# Patient Record
Sex: MALE | Race: WHITE | NOT HISPANIC OR LATINO
[De-identification: names, ages, dates, MRNs, and addresses within clinical notes are randomized per-mention and may not be internally consistent; named-entity substitution may affect disease eponyms.]

---

## 2017-06-23 PROBLEM — Z00.00 ENCOUNTER FOR PREVENTIVE HEALTH EXAMINATION: Status: ACTIVE | Noted: 2017-06-23

## 2017-08-18 ENCOUNTER — APPOINTMENT (OUTPATIENT)
Dept: SURGERY | Facility: CLINIC | Age: 67
End: 2017-08-18

## 2018-02-02 ENCOUNTER — OTHER (OUTPATIENT)
Age: 68
End: 2018-02-02

## 2018-02-02 ENCOUNTER — APPOINTMENT (OUTPATIENT)
Dept: SURGERY | Facility: CLINIC | Age: 68
End: 2018-02-02

## 2018-02-28 ENCOUNTER — EMERGENCY (EMERGENCY)
Facility: HOSPITAL | Age: 68
LOS: 0 days | Discharge: HOME | End: 2018-02-28
Attending: EMERGENCY MEDICINE

## 2018-02-28 VITALS
RESPIRATION RATE: 19 BRPM | OXYGEN SATURATION: 97 % | DIASTOLIC BLOOD PRESSURE: 78 MMHG | HEART RATE: 80 BPM | SYSTOLIC BLOOD PRESSURE: 131 MMHG | TEMPERATURE: 98 F

## 2018-02-28 VITALS
RESPIRATION RATE: 18 BRPM | HEART RATE: 75 BPM | HEIGHT: 65 IN | OXYGEN SATURATION: 97 % | WEIGHT: 184.09 LBS | TEMPERATURE: 98 F | DIASTOLIC BLOOD PRESSURE: 75 MMHG | SYSTOLIC BLOOD PRESSURE: 152 MMHG

## 2018-02-28 DIAGNOSIS — Z98.84 BARIATRIC SURGERY STATUS: ICD-10-CM

## 2018-02-28 DIAGNOSIS — S61.212A LACERATION WITHOUT FOREIGN BODY OF RIGHT MIDDLE FINGER WITHOUT DAMAGE TO NAIL, INITIAL ENCOUNTER: ICD-10-CM

## 2018-02-28 DIAGNOSIS — S61.214A LACERATION WITHOUT FOREIGN BODY OF RIGHT RING FINGER WITHOUT DAMAGE TO NAIL, INITIAL ENCOUNTER: ICD-10-CM

## 2018-02-28 DIAGNOSIS — Z79.82 LONG TERM (CURRENT) USE OF ASPIRIN: ICD-10-CM
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
## 2018-02-28 DIAGNOSIS — E78.5 HYPERLIPIDEMIA, UNSPECIFIED: ICD-10-CM
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
## 2018-02-28 DIAGNOSIS — K21.9 GASTRO-ESOPHAGEAL REFLUX DISEASE WITHOUT ESOPHAGITIS: ICD-10-CM

## 2018-02-28 DIAGNOSIS — Z23 ENCOUNTER FOR IMMUNIZATION: ICD-10-CM
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
## 2018-02-28 DIAGNOSIS — I10 ESSENTIAL (PRIMARY) HYPERTENSION: ICD-10-CM
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
## 2018-02-28 RX ORDER — TETANUS TOXOID, REDUCED DIPHTHERIA TOXOID AND ACELLULAR PERTUSSIS VACCINE, ADSORBED 5; 2.5; 8; 8; 2.5 [IU]/.5ML; [IU]/.5ML; UG/.5ML; UG/.5ML; UG/.5ML
0.5 SUSPENSION INTRAMUSCULAR ONCE
Qty: 0 | Refills: 0 | Status: COMPLETED | OUTPATIENT
Start: 2018-02-28 | End: 2018-02-28

## 2018-02-28 RX ADMIN — TETANUS TOXOID, REDUCED DIPHTHERIA TOXOID AND ACELLULAR PERTUSSIS VACCINE, ADSORBED 0.5 MILLILITER(S): 5; 2.5; 8; 8; 2.5 SUSPENSION INTRAMUSCULAR at 14:35

## 2018-02-28 NOTE — ED PROVIDER NOTE - SECONDARY DIAGNOSIS.
Need for tetanus booster Laceration without foreign body of right ring finger without damage to nail, initial encounter

## 2018-02-28 NOTE — ED ADULT NURSE NOTE - PMH
Gastric bypass status for obesity    GERD (gastroesophageal reflux disease)    HLD (hyperlipidemia)    HTN (hypertension)

## 2018-02-28 NOTE — ED PROVIDER NOTE - MEDICAL DECISION MAKING DETAILS
Wound care and repair. Pt instructed on proper wound care.  Will monitor area closely for signs of infection and will return if any redness, streaking, drainage, increased swelling, fevers or any other concerns. Suture removal 2 wks

## 2018-02-28 NOTE — ED PROVIDER NOTE - OBJECTIVE STATEMENT
Pt comes in c/o a laceration to his right hand. Pt states that his hand got caught in a hinge on a truck. Pt denies any numbness or tingling. Pt requires a tetanus shot.

## 2018-02-28 NOTE — ED PROVIDER NOTE - ATTENDING CONTRIBUTION TO CARE
67 yo M presents with c/o lacerations to right hand sustained after he caught it in door hinge .  Needs Tetanus.  On exam pt in NAD AAO x 3, + laceration x 2 to base of right middle and right ring finger dorsal aspect, good ROM, sensation intact  wound care and repair, Tetanus

## 2018-03-07 ENCOUNTER — OTHER (OUTPATIENT)
Age: 68
End: 2018-03-07

## 2018-04-02 ENCOUNTER — OTHER (OUTPATIENT)
Age: 68
End: 2018-04-02

## 2018-04-16 ENCOUNTER — OTHER (OUTPATIENT)
Age: 68
End: 2018-04-16

## 2018-05-14 ENCOUNTER — APPOINTMENT (OUTPATIENT)
Dept: SURGERY | Facility: CLINIC | Age: 68
End: 2018-05-14
Payer: MEDICARE

## 2018-05-14 VITALS — WEIGHT: 192 LBS | BODY MASS INDEX: 31.6 KG/M2 | HEIGHT: 65.5 IN

## 2018-05-14 DIAGNOSIS — G89.29 LOW BACK PAIN: ICD-10-CM

## 2018-05-14 DIAGNOSIS — Z82.3 FAMILY HISTORY OF STROKE: ICD-10-CM

## 2018-05-14 DIAGNOSIS — Z87.09 PERSONAL HISTORY OF OTHER DISEASES OF THE RESPIRATORY SYSTEM: ICD-10-CM

## 2018-05-14 DIAGNOSIS — Z82.49 FAMILY HISTORY OF ISCHEMIC HEART DISEASE AND OTHER DISEASES OF THE CIRCULATORY SYSTEM: ICD-10-CM

## 2018-05-14 DIAGNOSIS — M54.5 LOW BACK PAIN: ICD-10-CM

## 2018-05-14 PROCEDURE — 99213 OFFICE O/P EST LOW 20 MIN: CPT

## 2018-08-13 ENCOUNTER — APPOINTMENT (OUTPATIENT)
Dept: SURGERY | Facility: CLINIC | Age: 68
End: 2018-08-13

## 2021-05-24 NOTE — ED PROVIDER NOTE - CARE PLAN
24-May-2021 23:39
Principal Discharge DX:	Laceration without foreign body of right middle finger without damage to nail, initial encounter  Secondary Diagnosis:	Need for tetanus booster  Secondary Diagnosis:	Laceration without foreign body of right ring finger without damage to nail, initial encounter

## 2021-07-26 ENCOUNTER — INPATIENT (INPATIENT)
Facility: HOSPITAL | Age: 71
LOS: 3 days | Discharge: HOME | End: 2021-07-30
Attending: SURGERY | Admitting: SURGERY
Payer: MEDICARE

## 2021-07-26 VITALS
WEIGHT: 192.02 LBS | OXYGEN SATURATION: 100 % | DIASTOLIC BLOOD PRESSURE: 72 MMHG | HEART RATE: 75 BPM | SYSTOLIC BLOOD PRESSURE: 135 MMHG | RESPIRATION RATE: 18 BRPM | TEMPERATURE: 99 F | HEIGHT: 65 IN

## 2021-07-26 LAB
ALBUMIN SERPL ELPH-MCNC: 4.5 G/DL — SIGNIFICANT CHANGE UP (ref 3.5–5.2)
ALP SERPL-CCNC: 80 U/L — SIGNIFICANT CHANGE UP (ref 30–115)
ALT FLD-CCNC: 35 U/L — SIGNIFICANT CHANGE UP (ref 0–41)
ANION GAP SERPL CALC-SCNC: 16 MMOL/L — HIGH (ref 7–14)
ANISOCYTOSIS BLD QL: SLIGHT — SIGNIFICANT CHANGE UP
AST SERPL-CCNC: 28 U/L — SIGNIFICANT CHANGE UP (ref 0–41)
BASOPHILS # BLD AUTO: 0 K/UL — SIGNIFICANT CHANGE UP (ref 0–0.2)
BASOPHILS NFR BLD AUTO: 0 % — SIGNIFICANT CHANGE UP (ref 0–1)
BILIRUB SERPL-MCNC: 0.8 MG/DL — SIGNIFICANT CHANGE UP (ref 0.2–1.2)
BUN SERPL-MCNC: 23 MG/DL — HIGH (ref 10–20)
CALCIUM SERPL-MCNC: 9.2 MG/DL — SIGNIFICANT CHANGE UP (ref 8.5–10.1)
CHLORIDE SERPL-SCNC: 94 MMOL/L — LOW (ref 98–110)
CO2 SERPL-SCNC: 24 MMOL/L — SIGNIFICANT CHANGE UP (ref 17–32)
CREAT SERPL-MCNC: 1.5 MG/DL — SIGNIFICANT CHANGE UP (ref 0.7–1.5)
EOSINOPHIL # BLD AUTO: 0 K/UL — SIGNIFICANT CHANGE UP (ref 0–0.7)
EOSINOPHIL NFR BLD AUTO: 0 % — SIGNIFICANT CHANGE UP (ref 0–8)
GIANT PLATELETS BLD QL SMEAR: PRESENT — SIGNIFICANT CHANGE UP
GLUCOSE SERPL-MCNC: 229 MG/DL — HIGH (ref 70–99)
HCT VFR BLD CALC: 37.1 % — LOW (ref 42–52)
HGB BLD-MCNC: 12.5 G/DL — LOW (ref 14–18)
LIDOCAIN IGE QN: 91 U/L — HIGH (ref 7–60)
LYMPHOCYTES # BLD AUTO: 2.83 K/UL — SIGNIFICANT CHANGE UP (ref 1.2–3.4)
LYMPHOCYTES # BLD AUTO: 24.6 % — SIGNIFICANT CHANGE UP (ref 20.5–51.1)
MAGNESIUM SERPL-MCNC: 1.7 MG/DL — LOW (ref 1.8–2.4)
MANUAL SMEAR VERIFICATION: SIGNIFICANT CHANGE UP
MCHC RBC-ENTMCNC: 30.9 PG — SIGNIFICANT CHANGE UP (ref 27–31)
MCHC RBC-ENTMCNC: 33.7 G/DL — SIGNIFICANT CHANGE UP (ref 32–37)
MCV RBC AUTO: 91.6 FL — SIGNIFICANT CHANGE UP (ref 80–94)
MICROCYTES BLD QL: SLIGHT — SIGNIFICANT CHANGE UP
MONOCYTES # BLD AUTO: 1.1 K/UL — HIGH (ref 0.1–0.6)
MONOCYTES NFR BLD AUTO: 9.6 % — HIGH (ref 1.7–9.3)
NEUTROPHILS # BLD AUTO: 6.47 K/UL — SIGNIFICANT CHANGE UP (ref 1.4–6.5)
NEUTROPHILS NFR BLD AUTO: 54.4 % — SIGNIFICANT CHANGE UP (ref 42.2–75.2)
NEUTS BAND # BLD: 1.8 % — SIGNIFICANT CHANGE UP (ref 0–6)
PLAT MORPH BLD: NORMAL — SIGNIFICANT CHANGE UP
PLATELET # BLD AUTO: 198 K/UL — SIGNIFICANT CHANGE UP (ref 130–400)
POLYCHROMASIA BLD QL SMEAR: SLIGHT — SIGNIFICANT CHANGE UP
POTASSIUM SERPL-MCNC: 4.3 MMOL/L — SIGNIFICANT CHANGE UP (ref 3.5–5)
POTASSIUM SERPL-SCNC: 4.3 MMOL/L — SIGNIFICANT CHANGE UP (ref 3.5–5)
PROT SERPL-MCNC: 7 G/DL — SIGNIFICANT CHANGE UP (ref 6–8)
RBC # BLD: 4.05 M/UL — LOW (ref 4.7–6.1)
RBC # FLD: 12.5 % — SIGNIFICANT CHANGE UP (ref 11.5–14.5)
RBC BLD AUTO: ABNORMAL
SARS-COV-2 RNA SPEC QL NAA+PROBE: SIGNIFICANT CHANGE UP
SMUDGE CELLS # BLD: PRESENT — SIGNIFICANT CHANGE UP
SODIUM SERPL-SCNC: 134 MMOL/L — LOW (ref 135–146)
TROPONIN T SERPL-MCNC: <0.01 NG/ML — SIGNIFICANT CHANGE UP
VARIANT LYMPHS # BLD: 9.6 % — HIGH (ref 0–5)
WBC # BLD: 11.51 K/UL — HIGH (ref 4.8–10.8)
WBC # FLD AUTO: 11.51 K/UL — HIGH (ref 4.8–10.8)

## 2021-07-26 PROCEDURE — 99285 EMERGENCY DEPT VISIT HI MDM: CPT

## 2021-07-26 PROCEDURE — 93010 ELECTROCARDIOGRAM REPORT: CPT | Mod: 76

## 2021-07-26 PROCEDURE — 71046 X-RAY EXAM CHEST 2 VIEWS: CPT | Mod: 26

## 2021-07-26 RX ORDER — MAGNESIUM SULFATE 500 MG/ML
2 VIAL (ML) INJECTION ONCE
Refills: 0 | Status: COMPLETED | OUTPATIENT
Start: 2021-07-26 | End: 2021-07-26

## 2021-07-26 RX ORDER — KETOROLAC TROMETHAMINE 30 MG/ML
30 SYRINGE (ML) INJECTION ONCE
Refills: 0 | Status: DISCONTINUED | OUTPATIENT
Start: 2021-07-26 | End: 2021-07-26

## 2021-07-26 RX ADMIN — Medication 30 MILLIGRAM(S): at 21:12

## 2021-07-26 RX ADMIN — Medication 50 GRAM(S): at 21:53

## 2021-07-26 NOTE — ED PROVIDER NOTE - NS_EDPROVIDERDISPOUSERTYPE_ED_A_ED
What Type Of Note Output Would You Prefer (Optional)?: Standard Output How Severe Is Your Rash?: mild Is This A New Presentation, Or A Follow-Up?: Rash Attending Attestation (For Attendings USE Only)...

## 2021-07-26 NOTE — ED PROVIDER NOTE - ATTENDING CONTRIBUTION TO CARE
71M pmh dm, htn, route en y by Dr. Heredia 2014 p/w chest/epigastric pain since 10pm. Described R sided cp radiating to epigastrium, started @ rest, accomp by diaphoresis. Called 911, given ntg SL & full asa en route to ED. No dizziness, nv, flank pain, lower abd pain, LUTSx, edema, focal numbness or weakness. Rpts nl stress test with Dr. Banerjee 3/2021. +Smoker, +FHx HD. Admits to daily etoh use. PCP Campos.    PE:  elderly m, appears uncomfortable  skin warm, diaphoretic  ncat  neck supple  rrr nl s1s2 no mrg  ctab no wrr  abd soft nd +epigastric ttp rest non-tender no palpable masses no rgr  back non-tender no cvat  ext no cce dpi  neuro aaox3 grossly nf exam

## 2021-07-26 NOTE — ED PROVIDER NOTE - OBJECTIVE STATEMENT
70 y/o male with a PMH of HTN, HLD, and gastric bypass in 2014 (rou en y with  dr. escalante) presents to the ED for evaluation of chest pain and epigastric pain radiating tot he right and left upper abdomen that began around 10PM while watching TV. pt reports he had a nuclear stress test with dr. gibbs 03/2021 which he reports was normal. pt was given one dose of sublingual nitro and 4 baby aspirin by ems which improved his symptoms. pt reports nausea. pt reports his father had an MI at 78 years old. pt reports he drinks alcohol almost daily. pt denies fever, chills, cough, sob, back pain, vomiting, diarrhea, constipation, recent trauma, or cigarette smoking.

## 2021-07-26 NOTE — ED PROVIDER NOTE - CARE PLAN
Principal Discharge DX:	Chest pain  Secondary Diagnosis:	Abdominal pain  Secondary Diagnosis:	Cholelithiasis  Secondary Diagnosis:	Elevated lipase   Principal Discharge DX:	Chest pain  Secondary Diagnosis:	Abdominal pain  Secondary Diagnosis:	Cholelithiasis  Secondary Diagnosis:	Elevated lipase  Secondary Diagnosis:	Hypomagnesemia

## 2021-07-26 NOTE — ED PROVIDER NOTE - PMH
BPH (benign prostatic hyperplasia)    GERD (gastroesophageal reflux disease)    HLD (hyperlipidemia)    HTN (hypertension)

## 2021-07-26 NOTE — ED ADULT TRIAGE NOTE - CHIEF COMPLAINT QUOTE
72 yo M BIBA for sudden onset right sided chest pain radiating down to stomach 2 hours PTA. patient states he was sitting watching TV when it began. PMH of HTN. ems gave 4 baby ASA and 1 nitro with no relief.

## 2021-07-26 NOTE — ED PROVIDER NOTE - PHYSICAL EXAMINATION
Physical Exam    Vital Signs: I have reviewed the initial vital signs.  Constitutional: well-nourished, appears stated age, no acute distress  Eyes: Conjunctiva pink, Sclera clear  Cardiovascular: S1 and S2, regular rate, regular rhythm, well-perfused extremities, radial pulses equal and 2+ b/l.   Respiratory: unlabored respiratory effort, clear to auscultation bilaterally no wheezing, rales and rhonchi. pt is speaking full sentences. no accessory muscle use. no reproducible chest tenderness or chest wall crepitus.   Gastrointestinal: soft, (+) mild epigastric tenderness, nondistended abdomen, no pulsatile mass, normal bowl sounds, no rebound, no guarding, negative murphys. no organomegaly   Musculoskeletal: supple neck, no lower extremity edema, no calf tenderness  Integumentary: warm, dry, no rash  Neurologic: awake, alert, cranial nerves II-XII grossly intact, extremities’ motor and sensory functions grossly intact. steady gait.   Psychiatric: appropriate mood, appropriate affect

## 2021-07-26 NOTE — ED PROVIDER NOTE - CLINICAL SUMMARY MEDICAL DECISION MAKING FREE TEXT BOX
chest/epigastric pain, daily etoh use - ekg pvcs, ED w/u sig for mildly elevated lipase/bili/transaminitis, hypomag repleted, gb +stones no cholecystitis or cbd dilation, ct ap unremarkable - pt reassessed, still with epigastric pain, admitted for ACS work-up, inpatient GI consult & further mgmt

## 2021-07-26 NOTE — ED PROVIDER NOTE - NS ED ROS FT
CONST: No fever, chills or bodyaches  EYES: No pain, redness, drainage or visual changes.  ENT: No ear pain or discharge, nasal discharge or congestion. No sore throat  CARD: (+) chest pain, No palpitations  RESP: No SOB, cough, hemoptysis. No hx of asthma or COPD  GI: (+) abdominal pain, No N/V/D  : No urinary symptoms  MS: No joint pain, back pain or extremity pain/injury  SKIN: No rashes  NEURO: No headache, dizziness, paresthesias or LOC

## 2021-07-27 DIAGNOSIS — Z98.84 BARIATRIC SURGERY STATUS: Chronic | ICD-10-CM

## 2021-07-27 PROBLEM — I10 ESSENTIAL (PRIMARY) HYPERTENSION: Chronic | Status: ACTIVE | Noted: 2018-02-28

## 2021-07-27 PROBLEM — E78.5 HYPERLIPIDEMIA, UNSPECIFIED: Chronic | Status: ACTIVE | Noted: 2018-02-28

## 2021-07-27 PROBLEM — K21.9 GASTRO-ESOPHAGEAL REFLUX DISEASE WITHOUT ESOPHAGITIS: Chronic | Status: ACTIVE | Noted: 2018-02-28

## 2021-07-27 LAB
ALBUMIN SERPL ELPH-MCNC: 4.3 G/DL — SIGNIFICANT CHANGE UP (ref 3.5–5.2)
ALP SERPL-CCNC: 76 U/L — SIGNIFICANT CHANGE UP (ref 30–115)
ALT FLD-CCNC: 33 U/L — SIGNIFICANT CHANGE UP (ref 0–41)
ANION GAP SERPL CALC-SCNC: 13 MMOL/L — SIGNIFICANT CHANGE UP (ref 7–14)
APTT BLD: 34 SEC — SIGNIFICANT CHANGE UP (ref 27–39.2)
AST SERPL-CCNC: 26 U/L — SIGNIFICANT CHANGE UP (ref 0–41)
B-OH-BUTYR SERPL-SCNC: 0.3 MMOL/L — SIGNIFICANT CHANGE UP
BASOPHILS # BLD AUTO: 0.04 K/UL — SIGNIFICANT CHANGE UP (ref 0–0.2)
BASOPHILS NFR BLD AUTO: 0.3 % — SIGNIFICANT CHANGE UP (ref 0–1)
BILIRUB SERPL-MCNC: 1 MG/DL — SIGNIFICANT CHANGE UP (ref 0.2–1.2)
BLD GP AB SCN SERPL QL: SIGNIFICANT CHANGE UP
BUN SERPL-MCNC: 21 MG/DL — HIGH (ref 10–20)
CALCIUM SERPL-MCNC: 9.1 MG/DL — SIGNIFICANT CHANGE UP (ref 8.5–10.1)
CHLORIDE SERPL-SCNC: 95 MMOL/L — LOW (ref 98–110)
CO2 SERPL-SCNC: 26 MMOL/L — SIGNIFICANT CHANGE UP (ref 17–32)
CREAT SERPL-MCNC: 1.4 MG/DL — SIGNIFICANT CHANGE UP (ref 0.7–1.5)
EOSINOPHIL # BLD AUTO: 0 K/UL — SIGNIFICANT CHANGE UP (ref 0–0.7)
EOSINOPHIL NFR BLD AUTO: 0 % — SIGNIFICANT CHANGE UP (ref 0–8)
GLUCOSE BLDC GLUCOMTR-MCNC: 198 MG/DL — HIGH (ref 70–99)
GLUCOSE BLDC GLUCOMTR-MCNC: 220 MG/DL — HIGH (ref 70–99)
GLUCOSE BLDC GLUCOMTR-MCNC: 257 MG/DL — HIGH (ref 70–99)
GLUCOSE SERPL-MCNC: 256 MG/DL — HIGH (ref 70–99)
HCT VFR BLD CALC: 37.8 % — LOW (ref 42–52)
HGB BLD-MCNC: 12.7 G/DL — LOW (ref 14–18)
IMM GRANULOCYTES NFR BLD AUTO: 0.4 % — HIGH (ref 0.1–0.3)
INR BLD: 0.98 RATIO — SIGNIFICANT CHANGE UP (ref 0.65–1.3)
LACTATE SERPL-SCNC: 1.4 MMOL/L — SIGNIFICANT CHANGE UP (ref 0.7–2)
LYMPHOCYTES # BLD AUTO: 28.6 % — SIGNIFICANT CHANGE UP (ref 20.5–51.1)
LYMPHOCYTES # BLD AUTO: 3.5 K/UL — HIGH (ref 1.2–3.4)
MAGNESIUM SERPL-MCNC: 1.9 MG/DL — SIGNIFICANT CHANGE UP (ref 1.8–2.4)
MCHC RBC-ENTMCNC: 31.2 PG — HIGH (ref 27–31)
MCHC RBC-ENTMCNC: 33.6 G/DL — SIGNIFICANT CHANGE UP (ref 32–37)
MCV RBC AUTO: 92.9 FL — SIGNIFICANT CHANGE UP (ref 80–94)
MONOCYTES # BLD AUTO: 0.98 K/UL — HIGH (ref 0.1–0.6)
MONOCYTES NFR BLD AUTO: 8 % — SIGNIFICANT CHANGE UP (ref 1.7–9.3)
NEUTROPHILS # BLD AUTO: 7.67 K/UL — HIGH (ref 1.4–6.5)
NEUTROPHILS NFR BLD AUTO: 62.7 % — SIGNIFICANT CHANGE UP (ref 42.2–75.2)
NRBC # BLD: 0 /100 WBCS — SIGNIFICANT CHANGE UP (ref 0–0)
PHOSPHATE SERPL-MCNC: 2.6 MG/DL — SIGNIFICANT CHANGE UP (ref 2.1–4.9)
PLATELET # BLD AUTO: 176 K/UL — SIGNIFICANT CHANGE UP (ref 130–400)
POTASSIUM SERPL-MCNC: 4.8 MMOL/L — SIGNIFICANT CHANGE UP (ref 3.5–5)
POTASSIUM SERPL-SCNC: 4.8 MMOL/L — SIGNIFICANT CHANGE UP (ref 3.5–5)
PROT SERPL-MCNC: 6.7 G/DL — SIGNIFICANT CHANGE UP (ref 6–8)
PROTHROM AB SERPL-ACNC: 11.3 SEC — SIGNIFICANT CHANGE UP (ref 9.95–12.87)
RBC # BLD: 4.07 M/UL — LOW (ref 4.7–6.1)
RBC # FLD: 12.6 % — SIGNIFICANT CHANGE UP (ref 11.5–14.5)
SODIUM SERPL-SCNC: 134 MMOL/L — LOW (ref 135–146)
TROPONIN T SERPL-MCNC: <0.01 NG/ML — SIGNIFICANT CHANGE UP
TROPONIN T SERPL-MCNC: <0.01 NG/ML — SIGNIFICANT CHANGE UP
WBC # BLD: 12.24 K/UL — HIGH (ref 4.8–10.8)
WBC # FLD AUTO: 12.24 K/UL — HIGH (ref 4.8–10.8)

## 2021-07-27 PROCEDURE — 93010 ELECTROCARDIOGRAM REPORT: CPT

## 2021-07-27 PROCEDURE — 78226 HEPATOBILIARY SYSTEM IMAGING: CPT | Mod: 26

## 2021-07-27 PROCEDURE — 76705 ECHO EXAM OF ABDOMEN: CPT | Mod: 26

## 2021-07-27 PROCEDURE — 99221 1ST HOSP IP/OBS SF/LOW 40: CPT

## 2021-07-27 PROCEDURE — 74177 CT ABD & PELVIS W/CONTRAST: CPT | Mod: 26,MA

## 2021-07-27 RX ORDER — INSULIN GLARGINE 100 [IU]/ML
17 INJECTION, SOLUTION SUBCUTANEOUS AT BEDTIME
Refills: 0 | Status: DISCONTINUED | OUTPATIENT
Start: 2021-07-27 | End: 2021-07-29

## 2021-07-27 RX ORDER — VALSARTAN 80 MG/1
320 TABLET ORAL DAILY
Refills: 0 | Status: DISCONTINUED | OUTPATIENT
Start: 2021-07-27 | End: 2021-07-29

## 2021-07-27 RX ORDER — DEXTROSE 50 % IN WATER 50 %
12.5 SYRINGE (ML) INTRAVENOUS ONCE
Refills: 0 | Status: DISCONTINUED | OUTPATIENT
Start: 2021-07-27 | End: 2021-07-29

## 2021-07-27 RX ORDER — INSULIN LISPRO 100/ML
6 VIAL (ML) SUBCUTANEOUS
Refills: 0 | Status: DISCONTINUED | OUTPATIENT
Start: 2021-07-27 | End: 2021-07-29

## 2021-07-27 RX ORDER — DEXTROSE 50 % IN WATER 50 %
15 SYRINGE (ML) INTRAVENOUS ONCE
Refills: 0 | Status: DISCONTINUED | OUTPATIENT
Start: 2021-07-27 | End: 2021-07-29

## 2021-07-27 RX ORDER — INSULIN LISPRO 100/ML
VIAL (ML) SUBCUTANEOUS
Refills: 0 | Status: DISCONTINUED | OUTPATIENT
Start: 2021-07-27 | End: 2021-07-29

## 2021-07-27 RX ORDER — OMEPRAZOLE 10 MG/1
0 CAPSULE, DELAYED RELEASE ORAL
Qty: 0 | Refills: 0 | DISCHARGE

## 2021-07-27 RX ORDER — CHLORHEXIDINE GLUCONATE 213 G/1000ML
1 SOLUTION TOPICAL DAILY
Refills: 0 | Status: DISCONTINUED | OUTPATIENT
Start: 2021-07-27 | End: 2021-07-29

## 2021-07-27 RX ORDER — SIMVASTATIN 20 MG/1
0 TABLET, FILM COATED ORAL
Qty: 0 | Refills: 0 | DISCHARGE

## 2021-07-27 RX ORDER — TRAMADOL HYDROCHLORIDE 50 MG/1
25 TABLET ORAL EVERY 6 HOURS
Refills: 0 | Status: DISCONTINUED | OUTPATIENT
Start: 2021-07-27 | End: 2021-07-29

## 2021-07-27 RX ORDER — AMPICILLIN SODIUM AND SULBACTAM SODIUM 250; 125 MG/ML; MG/ML
3 INJECTION, POWDER, FOR SUSPENSION INTRAMUSCULAR; INTRAVENOUS ONCE
Refills: 0 | Status: COMPLETED | OUTPATIENT
Start: 2021-07-27 | End: 2021-07-27

## 2021-07-27 RX ORDER — AMPICILLIN SODIUM AND SULBACTAM SODIUM 250; 125 MG/ML; MG/ML
INJECTION, POWDER, FOR SUSPENSION INTRAMUSCULAR; INTRAVENOUS
Refills: 0 | Status: DISCONTINUED | OUTPATIENT
Start: 2021-07-27 | End: 2021-07-29

## 2021-07-27 RX ORDER — SODIUM CHLORIDE 9 MG/ML
1000 INJECTION, SOLUTION INTRAVENOUS
Refills: 0 | Status: DISCONTINUED | OUTPATIENT
Start: 2021-07-27 | End: 2021-07-29

## 2021-07-27 RX ORDER — DEXTROSE 50 % IN WATER 50 %
25 SYRINGE (ML) INTRAVENOUS ONCE
Refills: 0 | Status: DISCONTINUED | OUTPATIENT
Start: 2021-07-27 | End: 2021-07-29

## 2021-07-27 RX ORDER — ENOXAPARIN SODIUM 100 MG/ML
40 INJECTION SUBCUTANEOUS DAILY
Refills: 0 | Status: DISCONTINUED | OUTPATIENT
Start: 2021-07-27 | End: 2021-07-29

## 2021-07-27 RX ORDER — TAMSULOSIN HYDROCHLORIDE 0.4 MG/1
0.4 CAPSULE ORAL AT BEDTIME
Refills: 0 | Status: DISCONTINUED | OUTPATIENT
Start: 2021-07-27 | End: 2021-07-29

## 2021-07-27 RX ORDER — MAGNESIUM SULFATE 500 MG/ML
2 VIAL (ML) INJECTION ONCE
Refills: 0 | Status: DISCONTINUED | OUTPATIENT
Start: 2021-07-27 | End: 2021-07-27

## 2021-07-27 RX ORDER — VALSARTAN 80 MG/1
0 TABLET ORAL
Qty: 0 | Refills: 0 | DISCHARGE

## 2021-07-27 RX ORDER — AMLODIPINE BESYLATE 2.5 MG/1
10 TABLET ORAL DAILY
Refills: 0 | Status: DISCONTINUED | OUTPATIENT
Start: 2021-07-27 | End: 2021-07-29

## 2021-07-27 RX ORDER — SIMVASTATIN 20 MG/1
20 TABLET, FILM COATED ORAL AT BEDTIME
Refills: 0 | Status: DISCONTINUED | OUTPATIENT
Start: 2021-07-27 | End: 2021-07-29

## 2021-07-27 RX ORDER — AMPICILLIN SODIUM AND SULBACTAM SODIUM 250; 125 MG/ML; MG/ML
3 INJECTION, POWDER, FOR SUSPENSION INTRAMUSCULAR; INTRAVENOUS EVERY 6 HOURS
Refills: 0 | Status: DISCONTINUED | OUTPATIENT
Start: 2021-07-28 | End: 2021-07-29

## 2021-07-27 RX ORDER — ACETAMINOPHEN 500 MG
650 TABLET ORAL EVERY 6 HOURS
Refills: 0 | Status: DISCONTINUED | OUTPATIENT
Start: 2021-07-27 | End: 2021-07-28

## 2021-07-27 RX ORDER — CHLORHEXIDINE GLUCONATE 213 G/1000ML
1 SOLUTION TOPICAL ONCE
Refills: 0 | Status: COMPLETED | OUTPATIENT
Start: 2021-07-27 | End: 2021-07-27

## 2021-07-27 RX ORDER — PANTOPRAZOLE SODIUM 20 MG/1
40 TABLET, DELAYED RELEASE ORAL
Refills: 0 | Status: DISCONTINUED | OUTPATIENT
Start: 2021-07-27 | End: 2021-07-29

## 2021-07-27 RX ORDER — GLUCAGON INJECTION, SOLUTION 0.5 MG/.1ML
1 INJECTION, SOLUTION SUBCUTANEOUS ONCE
Refills: 0 | Status: DISCONTINUED | OUTPATIENT
Start: 2021-07-27 | End: 2021-07-29

## 2021-07-27 RX ORDER — ASPIRIN/CALCIUM CARB/MAGNESIUM 324 MG
81 TABLET ORAL DAILY
Refills: 0 | Status: DISCONTINUED | OUTPATIENT
Start: 2021-07-27 | End: 2021-07-29

## 2021-07-27 RX ADMIN — Medication 650 MILLIGRAM(S): at 07:43

## 2021-07-27 RX ADMIN — Medication 81 MILLIGRAM(S): at 10:59

## 2021-07-27 RX ADMIN — Medication 2: at 10:59

## 2021-07-27 RX ADMIN — Medication 3: at 07:49

## 2021-07-27 RX ADMIN — ENOXAPARIN SODIUM 40 MILLIGRAM(S): 100 INJECTION SUBCUTANEOUS at 11:00

## 2021-07-27 RX ADMIN — Medication 650 MILLIGRAM(S): at 08:15

## 2021-07-27 RX ADMIN — TAMSULOSIN HYDROCHLORIDE 0.4 MILLIGRAM(S): 0.4 CAPSULE ORAL at 22:05

## 2021-07-27 RX ADMIN — PANTOPRAZOLE SODIUM 40 MILLIGRAM(S): 20 TABLET, DELAYED RELEASE ORAL at 07:45

## 2021-07-27 RX ADMIN — AMLODIPINE BESYLATE 10 MILLIGRAM(S): 2.5 TABLET ORAL at 05:36

## 2021-07-27 RX ADMIN — INSULIN GLARGINE 17 UNIT(S): 100 INJECTION, SOLUTION SUBCUTANEOUS at 22:41

## 2021-07-27 RX ADMIN — VALSARTAN 320 MILLIGRAM(S): 80 TABLET ORAL at 05:36

## 2021-07-27 RX ADMIN — SIMVASTATIN 20 MILLIGRAM(S): 20 TABLET, FILM COATED ORAL at 22:06

## 2021-07-27 NOTE — H&P ADULT - HISTORY OF PRESENT ILLNESS
72 yo male , H/o DM II, HTN BPH and gastric Bypass presented for abdominal pain  Pain woke up yesterday with Nausea and one episode of vomiting. He had fatty breakfast and lunch  at 4PM he started to have RUQ and epigastric pain rate 8/10, non radiating, constant, pressure like, associated with anorexia.  No fever, chills, headache, diarrhea or constipation  No chest pain, palpitations or SOB  No similar episodes in the past  Pain was barely relieved by ASA and Nitroglycerin given by EMS  Patient reports drinking alcohol on daily basis 2 cups/day  ED vitals remarkable for hypertension Sbp 180 mmhg

## 2021-07-27 NOTE — ED ADULT NURSE REASSESSMENT NOTE - NS ED NURSE REASSESS COMMENT FT1
1445: Report given to RN on 3A. Transport came to take pt upstairs and is refusing to go upstairs. RN contacted medical resident, states will come to speak  to pt.

## 2021-07-27 NOTE — H&P ADULT - NSHPPHYSICALEXAM_GEN_ALL_CORE
PHYSICAL EXAM:      Constitutional: NAD    Respiratory: clear to auscultation bilaterally     Cardiovascular: regular rate and rhythm normal s1s2 no audible murmur    Gastrointestinal: soft positive bs , RUQ discomfort kc negative no organomegaly    Extremities No LE edema    Neurological: grossly intact, alert and oriented x 3

## 2021-07-27 NOTE — CONSULT NOTE ADULT - ATTENDING COMMENTS
Pt. seen and examined this afternoon with surgical resident.  Mr. Gardner is a very pleasant 70 y/o male with PMH of HTN, DM, s/p gastric bypass in 2012 by Dr. Heredia who presented to the ER last night c/o sudden onset of epigastric pain after eating zucchini quiche.  He states he did not vomit.  He denied any previous episodes of similar pain.  His last bowel movement was a few hours before I saw him today, and was normal by his report.  When I saw him, he stated that he felt "great" and that his pain had subsided.  He was hungry and asking when he can go home.  He had a fever of 102 upon arrival to the ER and of 101 when I saw him.      PMH as above   PSH gastric bypass  Meds as above  NKDA  Soc: drinks 4-5 glasses of [place] nightly  Quit smoking several years ago    T 101 VSS  A&OX3, NAD, lying comfortably in bed   Anicteric sclera  Abd soft, nontender, no Morrison's sign, nondistended, no peritoneal signs, no palpable masses  Ext warm    WBC 12K  LFTs normal  Glucose elevated in the 200's    U/S Abd and CT Abd/Pelvis reviewed by me.  Findings are:  (+) cholelithiasis, gallbladder mildly distended, normal CBD, no pericholecystic fluid, no thickened gallbladder wall.  No dilated bowel, gastric remnant not dilated, no mesenteric swirl sign, no bowel obstruction, no free air.    A/P 70 y/o male s/p gastric bypass in 2012 presenting with fever and resolved RUQ pain, r/o cholecystitis, although no secondary signs of cholecystitis on imaging.    Recommend HIDA scan   Can have bariatric clear liquids  DVT prophylaxis  Please consult patient's cardiologist, Dr. Banerjee for preoperative optimization for laparoscopic cholecystectomy in the event that the HIDA reveals cholecystitis.   Abx  Above discussed with patient, his wife, and daughter who is a nurse in the hospital.  All questions answered.  Will follow closely with you.

## 2021-07-27 NOTE — H&P ADULT - NSHPREVIEWOFSYSTEMS_GEN_ALL_CORE
General:	in pain    Respiratory and Thorax: none  	  Cardiovascular: none     Gastrointestinal: epigastric pain RUQ pain 8/10; anorexia nausea and vomiting    Genitourinary:  none	    Neurological: none

## 2021-07-27 NOTE — CONSULT NOTE ADULT - SUBJECTIVE AND OBJECTIVE BOX
GENERAL SURGERY CONSULT NOTE    Patient: CHRISTINA MARIO , 71y ([birth year])Male   MRN: [id number]  Location: [facility] ER Hold 011 A  Visit: 07-27-21 Inpatient  Date: 07-27-21 @ 16:24    HPI:  70 yo male , H/o DM II, HTN BPH and gastric Bypass presented for abdominal pain  Pain woke up yesterday with Nausea and one episode of vomiting. He had fatty breakfast and lunch  at 4PM he started to have RUQ and epigastric pain rate 8/10, non radiating, constant, pressure like, associated with anorexia.  No fever, chills, headache, diarrhea or constipation  No chest pain, palpitations or SOB  No similar episodes in the past  Pain was barely relieved by ASA and Nitroglycerin given by EMS  Patient reports drinking alcohol on daily basis 2 cups/day  ED vitals remarkable for hypertension Sbp 180 mmhg (27 Jul 2021 04:02)    Surgical consult:  71M with PSH of RNYGB in 2012 with an approx 110lb weight loss presenting with 1 day history of RUQ abdominal pain sudden onset after having greasy zucchini pie. Patient endorses associated N, no vomiting. Has been having BM (last BM yesterday AM), and tolerating diet last meal was 4pm day prior to presentation. This pain has never happened before, no F/chills at home. Upon presentation to the ED patient febrile to 102, U/S showing cholelithiasis with stone in neck of gallbladder however no overt signs of cholecystitis. By the time patient evaluated by surgical team, pain had resolved. Patient still spiking fevers to 101 does not endorse feeling as if he has F/chills.    Of note patient follows with Dr. Banerjee (cardiology) and had negative stress test in 3/2021. No recent EGD/colonoscopy, patient does endorse to apprx 3 cups of [place] every night, no history of NSAID use, no heartburn or epigastric pain prior to this episode.    PAST MEDICAL & SURGICAL HISTORY:  GERD (gastroesophageal reflux disease)    HTN (hypertension)    HLD (hyperlipidemia)    BPH (benign prostatic hyperplasia)    S/P gastric bypass        Home Medications:  aspirin 81 mg oral tablet: 1 tab(s) orally once a day (27 Jul 2021 03:59)  Januvia 25 mg oral tablet: 1 tab(s) orally once a day (27 Jul 2021 03:59)  simvastatin 20 mg oral tablet: 1 tab(s) orally once a day (at bedtime) (27 Jul 2021 03:59)  tamsulosin 0.4 mg oral capsule: 1 cap(s) orally once a day (27 Jul 2021 03:59)  valsartan-hydrochlorothiazide 320mg-25mg oral tablet: 1 tab(s) orally once a day (27 Jul 2021 03:59)        VITALS:  T(F): 101 (07-27-21 @ 15:39), Max: 102.1 (07-27-21 @ 07:30)  HR: 89 (07-27-21 @ 15:39) (71 - 103)  BP: 138/61 (07-27-21 @ 15:39) (94/45 - 187/84)  RR: 18 (07-27-21 @ 15:39) (16 - 18)  SpO2: 96% (07-27-21 @ 15:39) (96% - 100%)    PHYSICAL EXAM:  General: NAD, AAOx3, calm and cooperative  HEENT: NCAT, SHANTI, EOMI, Trachea ML, Neck supple  Cardiac: RRR S1, S2, no Murmurs, rubs or gallops  Respiratory: CTAB, normal respiratory effort, breath sounds equal BL, no wheeze, rhonchi or crackles  Abdomen: Soft, non-distended, non-tender, no rebound, no guarding. +BS.    MEDICATIONS  (STANDING):  amLODIPine   Tablet 10 milliGRAM(s) Oral daily  aspirin enteric coated 81 milliGRAM(s) Oral daily  chlorhexidine 4% Liquid 1 Application(s) Topical daily  dextrose 40% Gel 15 Gram(s) Oral once  dextrose 5%. 1000 milliLiter(s) (50 mL/Hr) IV Continuous <Continuous>  dextrose 5%. 1000 milliLiter(s) (100 mL/Hr) IV Continuous <Continuous>  dextrose 50% Injectable 25 Gram(s) IV Push once  dextrose 50% Injectable 12.5 Gram(s) IV Push once  dextrose 50% Injectable 25 Gram(s) IV Push once  enoxaparin Injectable 40 milliGRAM(s) SubCutaneous daily  glucagon  Injectable 1 milliGRAM(s) IntraMuscular once  insulin glargine Injectable (LANTUS) 17 Unit(s) SubCutaneous at bedtime  insulin lispro (ADMELOG) corrective regimen sliding scale   SubCutaneous three times a day before meals  insulin lispro Injectable (ADMELOG) 6 Unit(s) SubCutaneous three times a day before meals  pantoprazole    Tablet 40 milliGRAM(s) Oral before breakfast  simvastatin 20 milliGRAM(s) Oral at bedtime  tamsulosin 0.4 milliGRAM(s) Oral at bedtime  valsartan 320 milliGRAM(s) Oral daily    MEDICATIONS  (PRN):  acetaminophen   Tablet .. 650 milliGRAM(s) Oral every 6 hours PRN Mild Pain (1 - 3)  traMADol 25 milliGRAM(s) Oral every 6 hours PRN Moderate Pain (4 - 6)      LAB/STUDIES:                        12.7   12.24 )-----------( 176      ( 27 Jul 2021 06:10 )             37.8     07-27    134<L>  |  95<L>  |  21<H>  ----------------------------<  256<H>  4.8   |  26  |  1.4    Ca    9.1      27 Jul 2021 06:10  Phos  2.6     07-27  Mg     1.9     07-27    TPro  6.7  /  Alb  4.3  /  TBili  1.0  /  DBili  x   /  AST  26  /  ALT  33  /  AlkPhos  76  07-27    PT/INR - ( 27 Jul 2021 11:27 )   PT: 11.30 sec;   INR: 0.98 ratio         PTT - ( 27 Jul 2021 11:27 )  PTT:34.0 sec  LIVER FUNCTIONS - ( 27 Jul 2021 06:10 )  Alb: 4.3 g/dL / Pro: 6.7 g/dL / ALK PHOS: 76 U/L / ALT: 33 U/L / AST: 26 U/L / GGT: x             CARDIAC MARKERS ( 27 Jul 2021 06:10 )  x     / <0.01 ng/mL / x     / x     / x      CARDIAC MARKERS ( 27 Jul 2021 00:57 )  x     / <0.01 ng/mL / x     / x     / x      CARDIAC MARKERS ( 26 Jul 2021 20:54 )  x     / <0.01 ng/mL / x     / x     / x        IMAGING:  < from: CT Abdomen and Pelvis w/ IV Cont (07.27.21 @ 01:51) >  IMPRESSION:      No CT evidence of acute intra-abdominal infectious/inflammatory process.    < end of copied text >  < from: US Abdomen Upper Quadrant Right (07.27.21 @ 01:43) >  FINDINGS:    Liver: Hyperechoic homogeneous liver parenchyma. Smooth liver contour. 14.8 cm in length  Bile ducts: Normal caliber. Common bile duct measures 0.4 cm.  Gallbladder: Cholelithiasis. Negative sonographic Morrison's sign. No gallbladder wall thickening or pericholecystic fluid.  Pancreas: Obscured by overlying bowel gas.  Right kidney: 8.2 cm in length. No hydronephrosis. Renal vascular flow is demonstrated  Ascites: None.  IVC: Visualized portions are within normal limits.    IMPRESSION:    Cholelithiasis without definite evidence of cholecystitis. If clinically warranted, nuclear medicine HIDA scan may be of benefit.    < end of copied text >      ACCESS DEVICES:  [x ] Peripheral IV

## 2021-07-27 NOTE — H&P ADULT - NSICDXPASTMEDICALHX_GEN_ALL_CORE_FT
PAST MEDICAL HISTORY:  BPH (benign prostatic hyperplasia)     GERD (gastroesophageal reflux disease)     HLD (hyperlipidemia)     HTN (hypertension)

## 2021-07-27 NOTE — H&P ADULT - NSHPLABSRESULTS_GEN_ALL_CORE
< from: CT Abdomen and Pelvis w/ IV Cont (07.27.21 @ 01:51) >    No CT evidence of acute intra-abdominal infectious/inflammatory process.    < end of copied text >    < from: US Abdomen Upper Quadrant Right (07.27.21 @ 01:43) >    Cholelithiasis without definite evidence of cholecystitis. If clinically warranted, nuclear medicine HIDA scan may be of benefit.    < end of copied text >

## 2021-07-27 NOTE — H&P ADULT - ASSESSMENT
72 yo male , H/o DM II, HTN BPH and gastric Bypass presented for abdominal pain    # Abdominal pain    - likely from cholelithiasis  - unlikely cardiac pain. trop negative x 2 EKG with PVC no ischemic changes  - No CT evidence of acute intra-abdominal infectious/inflammatory process  - US abdomen Cholelithiasis without definite evidence of cholecystitis. If clinically warranted, nuclear medicine HIDA scan may be of benefit  - lipase 91; if pain worsens or recur would repeat another set. not 3 x Upper level of normal  - WBC 11 K.  sepsis ruled out on admission  - tylenol and tramadol PRN for pain  - LFTs Within normal limit  - cholelithiasis likely from alcohol consumption; counseled about alcohol cessation  - NPO for now until seen by surgery  - GI and surgery consult  - Get Coags and Type and screen    # DM II    - last A1c 7.6 2 weeks ago undocumented  - Repeat A1c  - Monitor FS keep -180  - avoid hypoglycemia  - Lantus 17 U at bedtime  - lispro 6 q 8 + corrective scale when patient getting food  - CC diet when able to eat  - on Januvia 25 mg daily at home  - AG 16, FU beta hydroxy 11:00 AM    # Hypertension    - DASH diet when cleared by surgery  - on Hydrochlorothiazide 25- Valsartan 320 mg daily  - cw valsartan 320 daily  - cw amlodipine 10 mg daily    # Dyslipidemia    - cw simvastatin 20 mg daily  - FU lipid panel  - DASH TLC diet    # BPH    - cw tamsulosin 0.4 mg daily    # Morbidly Obese  # s/p Gastric Bypass    - DASH TLC diet  - counseled about losing weight , exercise and healthy diet  - FU A1c , lipid panel    # Normocytic Anemia    - likely chronic  - FU iron studies, retic count FOB  - FU folate and B12  - needs OP colonoscopy if not up to date    # CKD III    - less likely CARIDAD  - monitor BMP  - avoid nephro toxins  - CT AP no hydronephrosis  - phosphorus AM    # DVT ppx lovenox  # GI ppx protonix  # NPO for now DASH TLC CC when cleared  # FULL code GOC discussed with patient

## 2021-07-28 LAB
A1C WITH ESTIMATED AVERAGE GLUCOSE RESULT: 8.4 % — HIGH (ref 4–5.6)
ALBUMIN SERPL ELPH-MCNC: 3.5 G/DL — SIGNIFICANT CHANGE UP (ref 3.5–5.2)
ALBUMIN SERPL ELPH-MCNC: 3.6 G/DL — SIGNIFICANT CHANGE UP (ref 3.5–5.2)
ALP SERPL-CCNC: 65 U/L — SIGNIFICANT CHANGE UP (ref 30–115)
ALP SERPL-CCNC: 67 U/L — SIGNIFICANT CHANGE UP (ref 30–115)
ALT FLD-CCNC: 22 U/L — SIGNIFICANT CHANGE UP (ref 0–41)
ALT FLD-CCNC: 24 U/L — SIGNIFICANT CHANGE UP (ref 0–41)
ANION GAP SERPL CALC-SCNC: 10 MMOL/L — SIGNIFICANT CHANGE UP (ref 7–14)
ANION GAP SERPL CALC-SCNC: 9 MMOL/L — SIGNIFICANT CHANGE UP (ref 7–14)
APPEARANCE UR: CLEAR — SIGNIFICANT CHANGE UP
APTT BLD: 35.8 SEC — SIGNIFICANT CHANGE UP (ref 27–39.2)
AST SERPL-CCNC: 19 U/L — SIGNIFICANT CHANGE UP (ref 0–41)
AST SERPL-CCNC: 20 U/L — SIGNIFICANT CHANGE UP (ref 0–41)
BACTERIA # UR AUTO: NEGATIVE — SIGNIFICANT CHANGE UP
BASOPHILS # BLD AUTO: 0.03 K/UL — SIGNIFICANT CHANGE UP (ref 0–0.2)
BASOPHILS NFR BLD AUTO: 0.3 % — SIGNIFICANT CHANGE UP (ref 0–1)
BILIRUB DIRECT SERPL-MCNC: 0.3 MG/DL — HIGH (ref 0–0.2)
BILIRUB DIRECT SERPL-MCNC: 0.4 MG/DL — HIGH (ref 0–0.2)
BILIRUB INDIRECT FLD-MCNC: 0.8 MG/DL — SIGNIFICANT CHANGE UP (ref 0.2–1.2)
BILIRUB INDIRECT FLD-MCNC: 1 MG/DL — SIGNIFICANT CHANGE UP (ref 0.2–1.2)
BILIRUB SERPL-MCNC: 1.1 MG/DL — SIGNIFICANT CHANGE UP (ref 0.2–1.2)
BILIRUB SERPL-MCNC: 1.4 MG/DL — HIGH (ref 0.2–1.2)
BILIRUB UR-MCNC: NEGATIVE — SIGNIFICANT CHANGE UP
BLD GP AB SCN SERPL QL: SIGNIFICANT CHANGE UP
BUN SERPL-MCNC: 17 MG/DL — SIGNIFICANT CHANGE UP (ref 10–20)
BUN SERPL-MCNC: 18 MG/DL — SIGNIFICANT CHANGE UP (ref 10–20)
CALCIUM SERPL-MCNC: 8.5 MG/DL — SIGNIFICANT CHANGE UP (ref 8.5–10.1)
CALCIUM SERPL-MCNC: 8.6 MG/DL — SIGNIFICANT CHANGE UP (ref 8.5–10.1)
CHLORIDE SERPL-SCNC: 98 MMOL/L — SIGNIFICANT CHANGE UP (ref 98–110)
CHLORIDE SERPL-SCNC: 98 MMOL/L — SIGNIFICANT CHANGE UP (ref 98–110)
CHOLEST SERPL-MCNC: 154 MG/DL — SIGNIFICANT CHANGE UP
CO2 SERPL-SCNC: 28 MMOL/L — SIGNIFICANT CHANGE UP (ref 17–32)
CO2 SERPL-SCNC: 28 MMOL/L — SIGNIFICANT CHANGE UP (ref 17–32)
COLOR SPEC: YELLOW — SIGNIFICANT CHANGE UP
CREAT SERPL-MCNC: 1.1 MG/DL — SIGNIFICANT CHANGE UP (ref 0.7–1.5)
CREAT SERPL-MCNC: 1.2 MG/DL — SIGNIFICANT CHANGE UP (ref 0.7–1.5)
DIFF PNL FLD: NEGATIVE — SIGNIFICANT CHANGE UP
EOSINOPHIL # BLD AUTO: 0.01 K/UL — SIGNIFICANT CHANGE UP (ref 0–0.7)
EOSINOPHIL NFR BLD AUTO: 0.1 % — SIGNIFICANT CHANGE UP (ref 0–8)
EPI CELLS # UR: 0 /HPF — SIGNIFICANT CHANGE UP (ref 0–5)
ESTIMATED AVERAGE GLUCOSE: 194 MG/DL — HIGH (ref 68–114)
GLUCOSE BLDC GLUCOMTR-MCNC: 134 MG/DL — HIGH (ref 70–99)
GLUCOSE BLDC GLUCOMTR-MCNC: 159 MG/DL — HIGH (ref 70–99)
GLUCOSE BLDC GLUCOMTR-MCNC: 171 MG/DL — HIGH (ref 70–99)
GLUCOSE BLDC GLUCOMTR-MCNC: 176 MG/DL — HIGH (ref 70–99)
GLUCOSE SERPL-MCNC: 131 MG/DL — HIGH (ref 70–99)
GLUCOSE SERPL-MCNC: 164 MG/DL — HIGH (ref 70–99)
GLUCOSE UR QL: ABNORMAL
HCT VFR BLD CALC: 32.4 % — LOW (ref 42–52)
HCT VFR BLD CALC: 36.9 % — LOW (ref 42–52)
HCV AB S/CO SERPL IA: 0.03 COI — SIGNIFICANT CHANGE UP
HCV AB SERPL-IMP: SIGNIFICANT CHANGE UP
HDLC SERPL-MCNC: 53 MG/DL — SIGNIFICANT CHANGE UP
HGB BLD-MCNC: 10.7 G/DL — LOW (ref 14–18)
HGB BLD-MCNC: 12.3 G/DL — LOW (ref 14–18)
HYALINE CASTS # UR AUTO: 1 /LPF — SIGNIFICANT CHANGE UP (ref 0–7)
IMM GRANULOCYTES NFR BLD AUTO: 0.5 % — HIGH (ref 0.1–0.3)
INR BLD: 1.14 RATIO — SIGNIFICANT CHANGE UP (ref 0.65–1.3)
IRON SATN MFR SERPL: 10 UG/DL — LOW (ref 35–150)
IRON SATN MFR SERPL: 5 % — LOW (ref 15–50)
KETONES UR-MCNC: SIGNIFICANT CHANGE UP
LEUKOCYTE ESTERASE UR-ACNC: NEGATIVE — SIGNIFICANT CHANGE UP
LIPID PNL WITH DIRECT LDL SERPL: 75 MG/DL — SIGNIFICANT CHANGE UP
LYMPHOCYTES # BLD AUTO: 2.82 K/UL — SIGNIFICANT CHANGE UP (ref 1.2–3.4)
LYMPHOCYTES # BLD AUTO: 24.2 % — SIGNIFICANT CHANGE UP (ref 20.5–51.1)
MAGNESIUM SERPL-MCNC: 2 MG/DL — SIGNIFICANT CHANGE UP (ref 1.8–2.4)
MAGNESIUM SERPL-MCNC: 2 MG/DL — SIGNIFICANT CHANGE UP (ref 1.8–2.4)
MCHC RBC-ENTMCNC: 30.5 PG — SIGNIFICANT CHANGE UP (ref 27–31)
MCHC RBC-ENTMCNC: 30.8 PG — SIGNIFICANT CHANGE UP (ref 27–31)
MCHC RBC-ENTMCNC: 33 G/DL — SIGNIFICANT CHANGE UP (ref 32–37)
MCHC RBC-ENTMCNC: 33.3 G/DL — SIGNIFICANT CHANGE UP (ref 32–37)
MCV RBC AUTO: 92.3 FL — SIGNIFICANT CHANGE UP (ref 80–94)
MCV RBC AUTO: 92.3 FL — SIGNIFICANT CHANGE UP (ref 80–94)
MONOCYTES # BLD AUTO: 0.96 K/UL — HIGH (ref 0.1–0.6)
MONOCYTES NFR BLD AUTO: 8.2 % — SIGNIFICANT CHANGE UP (ref 1.7–9.3)
NEUTROPHILS # BLD AUTO: 7.78 K/UL — HIGH (ref 1.4–6.5)
NEUTROPHILS NFR BLD AUTO: 66.7 % — SIGNIFICANT CHANGE UP (ref 42.2–75.2)
NITRITE UR-MCNC: NEGATIVE — SIGNIFICANT CHANGE UP
NON HDL CHOLESTEROL: 101 MG/DL — SIGNIFICANT CHANGE UP
NRBC # BLD: 0 /100 WBCS — SIGNIFICANT CHANGE UP (ref 0–0)
NRBC # BLD: 0 /100 WBCS — SIGNIFICANT CHANGE UP (ref 0–0)
PH UR: 6 — SIGNIFICANT CHANGE UP (ref 5–8)
PHOSPHATE SERPL-MCNC: 3.5 MG/DL — SIGNIFICANT CHANGE UP (ref 2.1–4.9)
PLATELET # BLD AUTO: 132 K/UL — SIGNIFICANT CHANGE UP (ref 130–400)
PLATELET # BLD AUTO: 146 K/UL — SIGNIFICANT CHANGE UP (ref 130–400)
POTASSIUM SERPL-MCNC: 3.9 MMOL/L — SIGNIFICANT CHANGE UP (ref 3.5–5)
POTASSIUM SERPL-MCNC: 3.9 MMOL/L — SIGNIFICANT CHANGE UP (ref 3.5–5)
POTASSIUM SERPL-SCNC: 3.9 MMOL/L — SIGNIFICANT CHANGE UP (ref 3.5–5)
POTASSIUM SERPL-SCNC: 3.9 MMOL/L — SIGNIFICANT CHANGE UP (ref 3.5–5)
PROT SERPL-MCNC: 5.9 G/DL — LOW (ref 6–8)
PROT SERPL-MCNC: 6.2 G/DL — SIGNIFICANT CHANGE UP (ref 6–8)
PROT UR-MCNC: ABNORMAL
PROTHROM AB SERPL-ACNC: 13.1 SEC — HIGH (ref 9.95–12.87)
RBC # BLD: 3.51 M/UL — LOW (ref 4.7–6.1)
RBC # BLD: 4 M/UL — LOW (ref 4.7–6.1)
RBC # BLD: 4 M/UL — LOW (ref 4.7–6.1)
RBC # FLD: 12.7 % — SIGNIFICANT CHANGE UP (ref 11.5–14.5)
RBC # FLD: 12.9 % — SIGNIFICANT CHANGE UP (ref 11.5–14.5)
RBC CASTS # UR COMP ASSIST: 4 /HPF — SIGNIFICANT CHANGE UP (ref 0–4)
RETICS #: 90.8 K/UL — SIGNIFICANT CHANGE UP (ref 25–125)
RETICS/RBC NFR: 2.3 % — HIGH (ref 0.5–1.5)
SODIUM SERPL-SCNC: 135 MMOL/L — SIGNIFICANT CHANGE UP (ref 135–146)
SODIUM SERPL-SCNC: 136 MMOL/L — SIGNIFICANT CHANGE UP (ref 135–146)
SP GR SPEC: 1.03 — SIGNIFICANT CHANGE UP (ref 1.01–1.03)
TIBC SERPL-MCNC: 193 UG/DL — LOW (ref 220–430)
TRANSFERRIN SERPL-MCNC: 163 MG/DL — LOW (ref 200–360)
TRIGL SERPL-MCNC: 117 MG/DL — SIGNIFICANT CHANGE UP
UIBC SERPL-MCNC: 183 UG/DL — SIGNIFICANT CHANGE UP (ref 110–370)
UROBILINOGEN FLD QL: SIGNIFICANT CHANGE UP
WBC # BLD: 11.66 K/UL — HIGH (ref 4.8–10.8)
WBC # BLD: 9.9 K/UL — SIGNIFICANT CHANGE UP (ref 4.8–10.8)
WBC # FLD AUTO: 11.66 K/UL — HIGH (ref 4.8–10.8)
WBC # FLD AUTO: 9.9 K/UL — SIGNIFICANT CHANGE UP (ref 4.8–10.8)
WBC UR QL: 1 /HPF — SIGNIFICANT CHANGE UP (ref 0–5)

## 2021-07-28 PROCEDURE — 74181 MRI ABDOMEN W/O CONTRAST: CPT | Mod: 26

## 2021-07-28 PROCEDURE — 99232 SBSQ HOSP IP/OBS MODERATE 35: CPT | Mod: 57

## 2021-07-28 PROCEDURE — 93010 ELECTROCARDIOGRAM REPORT: CPT

## 2021-07-28 RX ORDER — SODIUM CHLORIDE 9 MG/ML
500 INJECTION, SOLUTION INTRAVENOUS
Refills: 0 | Status: DISCONTINUED | OUTPATIENT
Start: 2021-07-28 | End: 2021-07-29

## 2021-07-28 RX ORDER — SODIUM CHLORIDE 9 MG/ML
500 INJECTION, SOLUTION INTRAVENOUS ONCE
Refills: 0 | Status: COMPLETED | OUTPATIENT
Start: 2021-07-28 | End: 2021-07-28

## 2021-07-28 RX ORDER — ACETAMINOPHEN 500 MG
650 TABLET ORAL ONCE
Refills: 0 | Status: COMPLETED | OUTPATIENT
Start: 2021-07-28 | End: 2021-07-28

## 2021-07-28 RX ORDER — ACETAMINOPHEN 500 MG
650 TABLET ORAL EVERY 6 HOURS
Refills: 0 | Status: DISCONTINUED | OUTPATIENT
Start: 2021-07-28 | End: 2021-07-29

## 2021-07-28 RX ADMIN — PANTOPRAZOLE SODIUM 40 MILLIGRAM(S): 20 TABLET, DELAYED RELEASE ORAL at 05:49

## 2021-07-28 RX ADMIN — Medication 81 MILLIGRAM(S): at 12:06

## 2021-07-28 RX ADMIN — AMPICILLIN SODIUM AND SULBACTAM SODIUM 200 GRAM(S): 250; 125 INJECTION, POWDER, FOR SUSPENSION INTRAMUSCULAR; INTRAVENOUS at 05:49

## 2021-07-28 RX ADMIN — SODIUM CHLORIDE 50 MILLILITER(S): 9 INJECTION, SOLUTION INTRAVENOUS at 12:40

## 2021-07-28 RX ADMIN — AMPICILLIN SODIUM AND SULBACTAM SODIUM 200 GRAM(S): 250; 125 INJECTION, POWDER, FOR SUSPENSION INTRAMUSCULAR; INTRAVENOUS at 12:06

## 2021-07-28 RX ADMIN — Medication 650 MILLIGRAM(S): at 06:01

## 2021-07-28 RX ADMIN — Medication 650 MILLIGRAM(S): at 18:28

## 2021-07-28 RX ADMIN — AMPICILLIN SODIUM AND SULBACTAM SODIUM 200 GRAM(S): 250; 125 INJECTION, POWDER, FOR SUSPENSION INTRAMUSCULAR; INTRAVENOUS at 23:24

## 2021-07-28 RX ADMIN — SIMVASTATIN 20 MILLIGRAM(S): 20 TABLET, FILM COATED ORAL at 21:45

## 2021-07-28 RX ADMIN — AMPICILLIN SODIUM AND SULBACTAM SODIUM 200 GRAM(S): 250; 125 INJECTION, POWDER, FOR SUSPENSION INTRAMUSCULAR; INTRAVENOUS at 17:09

## 2021-07-28 RX ADMIN — SODIUM CHLORIDE 500 MILLILITER(S): 9 INJECTION, SOLUTION INTRAVENOUS at 10:56

## 2021-07-28 RX ADMIN — AMPICILLIN SODIUM AND SULBACTAM SODIUM 200 GRAM(S): 250; 125 INJECTION, POWDER, FOR SUSPENSION INTRAMUSCULAR; INTRAVENOUS at 00:44

## 2021-07-28 RX ADMIN — TAMSULOSIN HYDROCHLORIDE 0.4 MILLIGRAM(S): 0.4 CAPSULE ORAL at 21:45

## 2021-07-28 RX ADMIN — SODIUM CHLORIDE 50 MILLILITER(S): 9 INJECTION, SOLUTION INTRAVENOUS at 20:49

## 2021-07-28 RX ADMIN — ENOXAPARIN SODIUM 40 MILLIGRAM(S): 100 INJECTION SUBCUTANEOUS at 12:06

## 2021-07-28 NOTE — CHART NOTE - NSCHARTNOTEFT_GEN_A_CORE
Pre-Op Note    Patient: CHRISTINA MARIO  MRN: [id number]  71y Male  Location: [facility] T13A 031 C  21 @ 19:53    Admit Diagnosis: CHEST PAIN;ABDOMINAL PAIN;CHOLELITHIASIS        Procedure:  Laparoscopic cholecystectomy Intraoperative cholangiogram possible open  Consent in Chart: [x] Yes [  ] No  Diet:   Fluids: dextrose 5%. 1000 milliLiter(s) (50 mL/Hr) IV Continuous <Continuous>  dextrose 5%. 1000 milliLiter(s) (100 mL/Hr) IV Continuous <Continuous>  lactated ringers. 500 milliLiter(s) (50 mL/Hr) IV Continuous <Continuous>    EK Lead ECG:   Ventricular Rate 94 BPM    Atrial Rate 94 BPM    P-R Interval 148 ms    QRS Duration 98 ms    Q-T Interval 358 ms    QTC Calculation(Bazett) 447 ms    P Axis 38 degrees    R Axis -20 degrees    T Axis 44 degrees    Diagnosis Line Normal sinus rhythm  Normal ECG    Confirmed by Obed Young ([id number]) on 2021 12:08:12 PM (21 @ 11:07)    CXR:  Xray Chest 2 Views PA/Lat:   EXAM:  XR CHEST PA LAT 2V            PROCEDURE DATE:  2021            INTERPRETATION:  CLINICAL HISTORY: Chest Pain.    COMPARISON: 2012.    TECHNIQUE: Frontal and lateral chest radiographs. Adequate positioning.    FINDINGS:    Support devices: None.    Cardiac/mediastinum/hilum: Stable.    Lung parenchyma/Pleura: No focal parenchymal opacities, pleural effusions, or pneumothorax.    Skeleton/soft tissues: Stable.      IMPRESSION:    No radiographic evidence of acute cardiopulmonarydisease.    --- End of Report ---              MARY LLOYD MD; Attending Radiologist  This document has been electronically signed. 2021  1:57PM (21 @ 22:06)      Vitals:  T(F): 100.7 (21 @ 17:13), Max: 102 (21 @ 05:47)  HR: 95 (21 @ 14:40) (83 - 101)  BP: 138/72 (21 @ 14:40) (106/55 - 144/78)  RR: 18 (21 @ 14:40) (18 - 18)  SpO2: 95% (07-28-21 @ 07:49) (95% - 98%)    Pre-OP Labs:  CAPILLARY BLOOD GLUCOSE      POCT Blood Glucose.: 176 mg/dL (2021 17:31)  POCT Blood Glucose.: 159 mg/dL (2021 11:33)  POCT Blood Glucose.: 171 mg/dL (2021 07:38)  POCT Blood Glucose.: 198 mg/dL (2021 22:16)                          12.3   11.66 )-----------( 146      ( 2021 08:45 )             36.9         136  |  98  |  18  ----------------------------<  164<H>  3.9   |  28  |  1.2    Ca    8.6      2021 08:45  Phos  3.5       Mg     2.0         TPro  6.2  /  Alb  3.6  /  TBili  1.4<H>  /  DBili  0.4<H>  /  AST  20  /  ALT  24  /  AlkPhos  67  07-28    PT/INR - ( 2021 11:27 )   PT: 11.30 sec;   INR: 0.98 ratio         PTT - ( 2021 11:27 )  PTT:34.0 sec  Urinalysis Basic - ( 2021 11:15 )    Color: Yellow / Appearance: Clear / S.028 / pH: x  Gluc: x / Ketone: Trace  / Bili: Negative / Urobili: <2 mg/dL   Blood: x / Protein: 100 mg/dL / Nitrite: Negative   Leuk Esterase: Negative / RBC: 4 /HPF / WBC 1 /HPF   Sq Epi: x / Non Sq Epi: 0 /HPF / Bacteria: Negative        Type & Screen: ABO RH Interpretation: O POS (21 @ 11:27)      Pregnancy Test: N/A     COVID: COVID-19 PCR: Francoisetesumeet (2021 20:49)

## 2021-07-28 NOTE — MEDICAL STUDENT PROGRESS NOTE(EDUCATION) - NS MD HP STUD ASPLAN PLAN FT
71y M w/ pmhx of  DM, HTN and Gastric bypass (2012) presented with acute onset epigastric pain that has now resolved. Spiking fevers with a Tmax 102.     Plan:  - HIDA scan  - NPO, IVF, Unasyn  - daily LFTs, CBC  - trend WBC and fever curve  - recommend UA/UCx/BCx  - surgery team to follow  - Cardiology consult for preoperative risk stratification for possible laparoscopic cholecystectomy this admission (Dr. Banerjee)    # Abdominal pain  - Acute onset epigastric pain after consumption of fatty meals   - US abdomen Cholelithiasis without definite evidence of cholecystitis.   - No CT evidence of acute intra-abdominal infectious/inflammatory process  - HIDA scan with no visualization of the gallbladder consistent with cholecystitis.   - lipase 91; if pain worsens or recur would repeat another set. not 3 x Upper level of normal  - WBC 11 K.  sepsis ruled out on admission  - tylenol and tramadol PRN for pain  - LFTs Within normal limit  - cholelithiasis likely from alcohol consumption; counseled about alcohol cessation  - NPO for now until seen by surgery  - GI and surgery consult  - Get Coags and Type and screen    # DM II    - last A1c 7.6 2 weeks ago undocumented  - Repeat A1c  - Monitor FS keep -180  - avoid hypoglycemia  - Lantus 17 U at bedtime  - lispro 6 q 8 + corrective scale when patient getting food  - CC diet when able to eat  - on Januvia 25 mg daily at home  - AG 16, FU beta hydroxy 11:00 AM    # Hypertension    - DASH diet when cleared by surgery  - on Hydrochlorothiazide 25- Valsartan 320 mg daily  - cw valsartan 320 daily  - cw amlodipine 10 mg daily    # Dyslipidemia    - cw simvastatin 20 mg daily  - FU lipid panel  - DASH TLC diet    # BPH    - cw tamsulosin 0.4 mg daily    # Morbidly Obese  # s/p Gastric Bypass    - DASH TLC diet  - counseled about losing weight , exercise and healthy diet  - FU A1c , lipid panel    # Normocytic Anemia    - likely chronic  - FU iron studies, retic count FOB  - FU folate and B12  - needs OP colonoscopy if not up to date    # CKD III    - less likely CARIDAD  - monitor BMP  - avoid nephro toxins  - CT AP no hydronephrosis  - phosphorus AM    # DVT ppx lovenox  # GI ppx protonix  # NPO for now DASH TLC CC when cleared  # FULL code GOC discussed with patient       71y M w/ pmhx of  DM, HTN and Gastric bypass (2012) presented with acute onset epigastric pain that has now resolved. Spiking fevers with a Tmax 102.      # Abdominal pain/Cholecystitis  - Acute onset epigastric pain after consumption of fatty meals  - LFTs Within normal limit, lipase 91  - US abdomen Cholelithiasis without definite evidence of cholecystitis.   - No CT evidence of acute intra-abdominal infectious/inflammatory process  - Surgery consulted recommended HIDA scan and abx (Unasyn)  - HIDA scan with no visualization of the gallbladder consistent with cholecystitis.   - WBC 11.6 (7/28/2021)    - Pt continues to spike fevers with tmax:102   - Tylenol PRN   - NPO  - Cardiology consult for preoperative risk stratification for possible laparoscopic cholecystectomy this admission (Dr. Banerjee)      # DM II  - BG this morning 117   - Lantus 17 U at bedtime  - lispro 6 q 8 and Sliding scale insulin   - on Januvia 25 mg daily at home      # Hypertension  - Blood pressures 106/55   - Hold bp meds; Valsartan 320 mg daily Amlodipine 10 mg daily    # DVT ppx lovenox  # GI ppx protonix  # NPO   # FULL code GOC discussed with patient 71y M w/ pmhx of  DM, HTN and Gastric bypass (2012) presented with acute onset epigastric pain that has now resolved however patient still spiking fevers with a Tmax 102.      # Abdominal pain/Cholecystitis  - Acute onset epigastric pain after consumption of fatty meals  - Febrile in , LFTs within normal limit, lipase 91  - US abdomen Cholelithiasis without definite evidence of cholecystitis.   - No CT evidence of acute intra-abdominal infectious/inflammatory process  - Surgery consulted recommended HIDA scan and abx (Unasyn)  - HIDA scan with no visualization of the gallbladder consistent with cholecystitis.   - WBC 11.6 (7/28/2021)    - Pt continues to spike fevers with tmax:102   - Tylenol PRN   - NPO  - Cardiology consult for preoperative risk stratification for possible laparoscopic cholecystectomy this admission (Dr. Banerjee)      # DM  - BG this morning 117   - Lantus 17 U at bedtime  - lispro 6 q 8 and Sliding scale insulin   - on Januvia 25 mg daily at home well controlled      # Hypertension  - Blood pressures 106/55   - Hold bp meds; Valsartan 320 mg daily Amlodipine 10 mg daily    Misc:  # DVT ppx: heparin subQ  # GI ppx: Protonix  # Diet: clear liquids, NPO  # Activity: As tolerated  # Code: full code  # Dispo: pending cardio clearance and surgery recommendations 71y M w/ pmhx of  DM, HTN and Gastric bypass (2012) presented with acute onset epigastric pain that has now resolved however patient still spiking fevers with a Tmax 102.    # Abdominal pain 2/2 to Cholecystitis  - Acute onset epigastric pain after consumption of fatty meals  - Febrile in , WBC 11k, sepsis ruled out on admission. LFTs within normal limit, lipase 91  - US abdomen Cholelithiasis without definite evidence of cholecystitis.   - No CT evidence of acute intra-abdominal infectious/inflammatory process  - Surgery consulted recommended HIDA scan and abx (Unasyn)  - HIDA scan with no visualization of the gallbladder consistent with cholecystitis.   - WBC 11.6 (7/28/2021)    - Pt continues to spike fevers with tmax:102   - Tylenol PRN   - NPO  - Cardiology consult for preoperative risk stratification for possible laparoscopic cholecystectomy this admission (Dr. Banerjee)      # DM  - BG this morning 117   - Lantus 17 U at bedtime  - lispro 6 q 8 and Sliding scale insulin   - on Januvia 25 mg daily at home well controlled      # Hypertension  - Blood pressures 106/55   - Hold bp meds; Valsartan 320 mg daily Amlodipine 10 mg daily    Misc:  # DVT ppx: heparin subQ  # GI ppx: Protonix  # Diet: clear liquids, NPO  # Activity: As tolerated  # Code: full code  # Dispo: pending cardio clearance and surgery recommendations 71y M w/ pmhx of  DM, HTN and Gastric bypass (2012) presented with acute onset epigastric pain that has now resolved however patient still spiking fevers with a Tmax 102.    # Abdominal pain 2/2 to Cholecystitis  - Acute onset epigastric pain after consumption of fatty meals  - Febrile in , WBC 11k, hypotensive, tachycardic: septic on admission. 1 set of blood cultures ordered and received  -  LFTs within normal limit, lipase 91  - US abdomen Cholelithiasis without definite evidence of cholecystitis.   - No CT evidence of acute intra-abdominal infectious/inflammatory process  - Surgery consulted recommended HIDA scan and started patient on abx (Unasyn)  - HIDA scan with no visualization of the gallbladder consistent with cholecystitis.   - WBC 11.6 (7/28/2021)    - Pt continues to spike fevers with tmax:102   - Tylenol PRN   - NPO  - Cardiology consult for preoperative risk stratification for possible laparoscopic cholecystectomy this admission (Dr. Banerjee)      # DM  - BG this morning 117   - Lantus 17 U at bedtime  - lispro 6 q 8 and Sliding scale insulin   - on Januvia 25 mg daily at home well controlled      # Hypertension  - Blood pressures 106/55   - Hold bp meds; Valsartan 320 mg daily Amlodipine 10 mg daily    Misc:  # DVT ppx: heparin subQ  # GI ppx: Protonix  # Diet: clear liquids, NPO  # Activity: As tolerated  # Code: full code  # Dispo: pending cardio clearance and surgery recommendations 71y M w/ pmhx of  DM, HTN and Gastric bypass (2012) presented with acute onset epigastric pain that has now resolved however patient still spiking fevers with a Tmax 102.    # Abdominal pain 2/2 to Cholecystitis  - Acute onset epigastric pain after consumption of fatty meals  - Febrile in , WBC 11k, hypotensive, tachycardic: septic on admission. 1 set of blood cultures ordered and received  -  LFTs within normal limit, lipase 91  - US abdomen Cholelithiasis without definite evidence of cholecystitis.   - No CT evidence of acute intra-abdominal infectious/inflammatory process  - Surgery consulted recommended HIDA scan and started patient on abx (Unasyn).   - Also recommended NPO, IVF, daily LFTs, CBC, trend WBC and temp for fever spikes  - HIDA scan with no visualization of the gallbladder consistent with cholecystitis.   - WBC 11.6 (7/28/2021), fever 102 in AM, pt given Tylenol    - Pending Cardiology consult for preoperative risk stratification for possible laparoscopic cholecystectomy this admission (Dr. Banerjee)  - NPO, Tylenol PRN, IV fluids      # DM  - BG this morning 117   - Lantus 17 U at bedtime  - lispro 6 q 8 and Sliding scale insulin   - on Januvia 25 mg daily at home well controlled      # Hypertension  - Blood pressures 106/55   - Hold bp meds; Valsartan 320 mg daily Amlodipine 10 mg daily    Misc:  # DVT ppx: heparin subQ  # GI ppx: Protonix  # Diet: clear liquids, NPO  # Activity: As tolerated  # Code: full code  # Dispo: pending cardio clearance and surgery recommendations 71y M w/ pmhx of  DM, HTN and Gastric bypass (2012) presented with acute onset epigastric pain that has now resolved however patient still spiking fevers with a Tmax 102.    # Abdominal pain 2/2 to Cholecystitis  - Acute onset epigastric pain after consumption of fatty meals  - Febrile in , WBC 11k, hypotensive, tachycardic: septic on admission. 1 set of blood cultures ordered and received  -  LFTs within normal limit, lipase 91  - US abdomen Cholelithiasis without definite evidence of cholecystitis.   - No CT evidence of acute intra-abdominal infectious/inflammatory process  - Surgery consulted recommended HIDA scan and started patient on abx (Unasyn).   - Also recommended NPO, IVF, daily LFTs, CBC, trend WBC and temp for fever spikes  - HIDA scan with no visualization of the gallbladder consistent with cholecystitis.   - WBC 11.6 (7/28/2021), fever 102 in AM, pt given Tylenol    - Cardiology consult for preoperative risk stratification for possible laparoscopic cholecystectomy this admission  - Moderate-risk patient for a Moderate-risk surgery/procedure. No further cardiac work-up. No current cardiac contraindications to prevent from proceeding with the scheduled surgery/procedure.  - MRCP performed. Pending results  - Scheduled for laparoscopic cholecystectomy tomorrow.   - Pre-op labs ordered (CBC, CMP, Mg, T&S, PTT, PT & INR, Hep function panel)  - EKG normal sinus rhythm, CXR 7/26 normal findings  - NPO, Tylenol PRN, IV fluids      # DM  - BG this morning 117   - Lantus 17 U at bedtime  - lispro 6 q 8 and Sliding scale insulin   - on Januvia 25 mg daily at home well controlled      # Hypertension  - Blood pressures 106/55   - Hold bp meds; Valsartan 320 mg daily Amlodipine 10 mg daily    Misc:  # DVT ppx: heparin subQ  # GI ppx: Protonix  # Diet: clear liquids, NPO  # Activity: As tolerated  # Code: full code  # Dispo: Laparoscopic Cholecystectomy tomorrow, acute    #Handoff  - F/u 8pm pre-op labs (CBC, CMP, coags, Hep function, T&S)  - F/U MRCP results

## 2021-07-28 NOTE — PROGRESS NOTE ADULT - ATTENDING COMMENTS
Pt was seen and examined earlier this am.  He was still pain-free but was still febrile.      Febrile, VSS  A&OX3, NAD  anicteric sclera  Abd soft, NT, ND    Bilirubin increased to 1.4  HIDA (+) for acute cholecystitis    I explained the results of the HIDA scan to the patient and his daughter.  I explained the risks/benefits of laparoscopic cholecystectomy, possible open. possible intraoperative cholangiogram to the patient.  He is in agreement to undergo surgery once cleared by cardiology.    MRCP has been done.  Awaiting official reading.  Cardiology has seen the patient and deemed him intermediate risk.    Plan for surgery tomorrow. Pt was seen and examined earlier this am.  He was still pain-free but was still febrile.      Febrile, VSS  A&OX3, NAD  anicteric sclera  Abd soft, NT, ND    Bilirubin increased to 1.4  HIDA (+) for acute cholecystitis    I explained the results of the HIDA scan to the patient and his daughter.  I explained the risks/benefits of laparoscopic cholecystectomy, possible open. possible intraoperative cholangiogram to the patient.  He is in agreement to undergo surgery once cleared by cardiology.    MRCP has been done.  Awaiting official reading.  Cardiology has seen the patient and deemed him moderate risk.    Plan for surgery tomorrow.

## 2021-07-28 NOTE — CONSULT NOTE ADULT - SUBJECTIVE AND OBJECTIVE BOX
DATE OF ADMISSION: 07-27-21  HOSPITAL DAY: 1d    REASON FOR CONSULT: preoperative risk stratification    HISTORY OF PRESENT ILLNESS:   Patient is a 71ymale with PMH below who presented to the hospital for right upper quadrant abdominal pain of 1 days duration. The patient initially thought he was having a heart attack because the pain was near the bottom part of his ribs on both sides. He was found to have cholelithiasis that progressed to cholecystitis. Surgery team has been following the patient and have scheduled him for the OR on 7/29/2021 for laparoscopic cholecystectomy.      PAST MEDICAL & SURGICAL HISTORY:  GERD (gastroesophageal reflux disease)    HTN (hypertension)    HLD (hyperlipidemia)    BPH (benign prostatic hyperplasia)    S/P gastric bypass      FAMILY HISTORY:  [X] No pertinent family history of premature cardiovascular disease in first degree relatives  Mother:   Father:   Siblings:     SOCIAL HISTORY:  [  ] Non-smoker  [X] Formker smoker: smoked for 2 years during [place] [id number]  [X] Alcohol use: drinks 2 bourbon and gingers every night  [  ] Drug use: denies    ALLERGIES:  sulfa drugs (Hives; Urticaria)    MEDICATIONS:  MEDICATIONS  (STANDING):  amLODIPine   Tablet 10 milliGRAM(s) Oral daily  ampicillin/sulbactam  IVPB      ampicillin/sulbactam  IVPB 3 Gram(s) IV Intermittent every 6 hours  aspirin enteric coated 81 milliGRAM(s) Oral daily  chlorhexidine 4% Liquid 1 Application(s) Topical daily  dextrose 40% Gel 15 Gram(s) Oral once  dextrose 5%. 1000 milliLiter(s) (50 mL/Hr) IV Continuous <Continuous>  dextrose 5%. 1000 milliLiter(s) (100 mL/Hr) IV Continuous <Continuous>  dextrose 50% Injectable 25 Gram(s) IV Push once  dextrose 50% Injectable 12.5 Gram(s) IV Push once  dextrose 50% Injectable 25 Gram(s) IV Push once  enoxaparin Injectable 40 milliGRAM(s) SubCutaneous daily  glucagon  Injectable 1 milliGRAM(s) IntraMuscular once  insulin glargine Injectable (LANTUS) 17 Unit(s) SubCutaneous at bedtime  insulin lispro (ADMELOG) corrective regimen sliding scale   SubCutaneous three times a day before meals  insulin lispro Injectable (ADMELOG) 6 Unit(s) SubCutaneous three times a day before meals  lactated ringers. 500 milliLiter(s) (50 mL/Hr) IV Continuous <Continuous>  pantoprazole    Tablet 40 milliGRAM(s) Oral before breakfast  simvastatin 20 milliGRAM(s) Oral at bedtime  tamsulosin 0.4 milliGRAM(s) Oral at bedtime  valsartan 320 milliGRAM(s) Oral daily    MEDICATIONS  (PRN):  acetaminophen   Tablet .. 650 milliGRAM(s) Oral every 6 hours PRN Mild Pain (1 - 3)  traMADol 25 milliGRAM(s) Oral every 6 hours PRN Moderate Pain (4 - 6)    HOME MEDICATIONS:  Home Medications:  aspirin 81 mg oral tablet: 1 tab(s) orally once a day (27 Jul 2021 03:59)  Januvia 25 mg oral tablet: 1 tab(s) orally once a day (27 Jul 2021 03:59)  simvastatin 20 mg oral tablet: 1 tab(s) orally once a day (at bedtime) (27 Jul 2021 03:59)  tamsulosin 0.4 mg oral capsule: 1 cap(s) orally once a day (27 Jul 2021 03:59)  valsartan-hydrochlorothiazide 320mg-25mg oral tablet: 1 tab(s) orally once a day (27 Jul 2021 03:59)    REVIEW OF SYSTEMS:  CONSTITUTIONAL: No weakness, (+) fevers, no chills, or fatigue  HEENT: no visual changes, vertigo, throat pain, or hearing loss  RESPIRATORY: no shortness of breath, cough, or wheezing  CARDIOVASCULAR: no chest pain, palpitations, or syncopal episodes  GASTROINTESTINAL: (+) abdominal pain, no nausea, vomiting, diarrhea, constipation, melena or hematochezia  NEUROLOGICAL: no numbness or weakness  ENDOCRINOLOGICAL: no recent change in diabetic medications  MUSCULOSKELETAL: no joint pain or muscle pain  GENITOURINARY: no dysuria, frequency, or hematuria  SKIN: no itching, rashes, or bruising    VITALS:   T(C): 37.4 (07-28-21 @ 07:49), Max: 38.9 (07-28-21 @ 05:47)  HR: 83 (07-28-21 @ 07:49) (83 - 101)  BP: 127/69 (07-28-21 @ 07:49) (106/55 - 144/78)  RR: 18 (07-28-21 @ 05:47) (18 - 18)  SpO2: 95% (07-28-21 @ 07:49) (95% - 99%)  Wt(kg): --  I&O's Summary    PHYSICAL EXAM:  CONSTITUTIONAL: no acute distress, well appearing  NEUROLOGICAL: patient is awake, alert, and oriented, no focal deficits  NECK: no thyroid enlargement, no JVD, no carotid bruit  EYES: no xanthomalasia, EOM intact  LUNGS: Clear to auscultation bilaterally  CARDIOVASCULAR: regular rate and rhythm, audible S1/S2, no JVD, no murmurs, no edema  ABDOMEN: soft, non-tender, non-distended, bowel sounds present  EXTREMITIES: able to move all four extremities, no clubbing, cyanosis or edema  VASCULAR: 2+ palpable peripheral pulses bilaterally  NEUROLOGICAL: non-focal  PSYCH: appropriate mood and affect  SKIN: intact    LABS:                        12.3   11.66 )-----------( 146      ( 28 Jul 2021 08:45 )             36.9     07-28    136  |  98  |  18  ----------------------------<  164<H>  3.9   |  28  |  1.2    Ca    8.6      28 Jul 2021 08:45  Phos  3.5     07-28  Mg     2.0     07-28    TPro  6.2  /  Alb  3.6  /  TBili  1.4<H>  /  DBili  0.4<H>  /  AST  20  /  ALT  24  /  AlkPhos  67  07-28    CARDIAC MARKERS ( 27 Jul 2021 06:10 )  x     / <0.01 ng/mL / x     / x     / x      CARDIAC MARKERS ( 27 Jul 2021 00:57 )  x     / <0.01 ng/mL / x     / x     / x      CARDIAC MARKERS ( 26 Jul 2021 20:54 )  x     / <0.01 ng/mL / x     / x     / x          PT/INR - ( 27 Jul 2021 11:27 )   PT: 11.30 sec;   INR: 0.98 ratio         PTT - ( 27 Jul 2021 11:27 )  PTT:34.0 sec          CARDIAC MARKERS:  Troponin T, Serum: <0.01 ng/mL (07-27 @ 06:10)  Troponin T, Serum: <0.01 ng/mL (07-27 @ 00:57)  Troponin T, Serum: <0.01 ng/mL (07-26 @ 20:54)          TELEMETRY EVENTS:  ECG:  CHEST X-RAY:  OTHER:    PREVIOUS DIAGNOSTIC TESTING:  [  ] Echocardiogram  [  ] Catheterization  [  ] Stress Test  [  ] Coronary CTA DATE OF ADMISSION: 07-27-21  HOSPITAL DAY: 1d    REASON FOR CONSULT: preoperative risk stratification    HISTORY OF PRESENT ILLNESS:   Patient is a 71ymale with PMH below who presented to the hospital for right upper quadrant abdominal pain of 1 days duration. The patient initially thought he was having a heart attack because the pain was near the bottom part of his ribs on both sides. He was found to have cholelithiasis that progressed to cholecystitis. Surgery team has been following the patient and have scheduled him for the OR on 7/29/2021 for laparoscopic cholecystectomy.      PAST MEDICAL & SURGICAL HISTORY:  GERD (gastroesophageal reflux disease)    HTN (hypertension)    HLD (hyperlipidemia)    BPH (benign prostatic hyperplasia)    S/P gastric bypass      FAMILY HISTORY:  [X] No pertinent family history of premature cardiovascular disease in first degree relatives  Mother:   Father:   Siblings:     SOCIAL HISTORY:  [  ] Non-smoker  [X] Formker smoker: smoked for 2 years during [place] War [id number]  [X] Alcohol use: drinks 3-4 bourbon and gingers every night  [  ] Drug use: denies    ALLERGIES:  sulfa drugs (Hives; Urticaria)    MEDICATIONS:  MEDICATIONS  (STANDING):  amLODIPine   Tablet 10 milliGRAM(s) Oral daily  ampicillin/sulbactam  IVPB      ampicillin/sulbactam  IVPB 3 Gram(s) IV Intermittent every 6 hours  aspirin enteric coated 81 milliGRAM(s) Oral daily  chlorhexidine 4% Liquid 1 Application(s) Topical daily  dextrose 40% Gel 15 Gram(s) Oral once  dextrose 5%. 1000 milliLiter(s) (50 mL/Hr) IV Continuous <Continuous>  dextrose 5%. 1000 milliLiter(s) (100 mL/Hr) IV Continuous <Continuous>  dextrose 50% Injectable 25 Gram(s) IV Push once  dextrose 50% Injectable 12.5 Gram(s) IV Push once  dextrose 50% Injectable 25 Gram(s) IV Push once  enoxaparin Injectable 40 milliGRAM(s) SubCutaneous daily  glucagon  Injectable 1 milliGRAM(s) IntraMuscular once  insulin glargine Injectable (LANTUS) 17 Unit(s) SubCutaneous at bedtime  insulin lispro (ADMELOG) corrective regimen sliding scale   SubCutaneous three times a day before meals  insulin lispro Injectable (ADMELOG) 6 Unit(s) SubCutaneous three times a day before meals  lactated ringers. 500 milliLiter(s) (50 mL/Hr) IV Continuous <Continuous>  pantoprazole    Tablet 40 milliGRAM(s) Oral before breakfast  simvastatin 20 milliGRAM(s) Oral at bedtime  tamsulosin 0.4 milliGRAM(s) Oral at bedtime  valsartan 320 milliGRAM(s) Oral daily    MEDICATIONS  (PRN):  acetaminophen   Tablet .. 650 milliGRAM(s) Oral every 6 hours PRN Mild Pain (1 - 3)  traMADol 25 milliGRAM(s) Oral every 6 hours PRN Moderate Pain (4 - 6)    HOME MEDICATIONS:  Home Medications:  aspirin 81 mg oral tablet: 1 tab(s) orally once a day (27 Jul 2021 03:59)  Januvia 25 mg oral tablet: 1 tab(s) orally once a day (27 Jul 2021 03:59)  simvastatin 20 mg oral tablet: 1 tab(s) orally once a day (at bedtime) (27 Jul 2021 03:59)  tamsulosin 0.4 mg oral capsule: 1 cap(s) orally once a day (27 Jul 2021 03:59)  valsartan-hydrochlorothiazide 320mg-25mg oral tablet: 1 tab(s) orally once a day (27 Jul 2021 03:59)    REVIEW OF SYSTEMS:  CONSTITUTIONAL: No weakness, (+) fevers, no chills, or fatigue  HEENT: no visual changes, vertigo, throat pain, or hearing loss  RESPIRATORY: no shortness of breath, cough, or wheezing  CARDIOVASCULAR: no chest pain, palpitations, or syncopal episodes  GASTROINTESTINAL: (+) abdominal pain, no nausea, vomiting, diarrhea, constipation, melena or hematochezia  NEUROLOGICAL: no numbness or weakness  ENDOCRINOLOGICAL: no recent change in diabetic medications  MUSCULOSKELETAL: no joint pain or muscle pain  GENITOURINARY: no dysuria, frequency, or hematuria  SKIN: no itching, rashes, or bruising    VITALS:   T(C): 37.4 (07-28-21 @ 07:49), Max: 38.9 (07-28-21 @ 05:47)  HR: 83 (07-28-21 @ 07:49) (83 - 101)  BP: 127/69 (07-28-21 @ 07:49) (106/55 - 144/78)  RR: 18 (07-28-21 @ 05:47) (18 - 18)  SpO2: 95% (07-28-21 @ 07:49) (95% - 99%)  Wt(kg): --  I&O's Summary    PHYSICAL EXAM:  CONSTITUTIONAL: no acute distress, well appearing, obese, uncomfortable (?withdrawing)  NEUROLOGICAL: patient is awake, alert, and oriented, no focal deficits  NECK: no thyroid enlargement, no JVD, no carotid bruit  EYES: no xanthomalasia, EOM intact  LUNGS: Clear to auscultation bilaterally  CARDIOVASCULAR: regular rhythm, tachycardic, audible S1/S2, no JVD, no murmurs, no edema  ABDOMEN: soft, tender in RUQ, distended, bowel sounds present, hepatomegaly  EXTREMITIES: able to move all four extremities, no clubbing, cyanosis or edema  VASCULAR: 2+ palpable peripheral pulses bilaterally  NEUROLOGICAL: non-focal  PSYCH: appropriate mood and affect, slightly agitated  SKIN: intact    LABS:                        12.3   11.66 )-----------( 146      ( 28 Jul 2021 08:45 )             36.9     07-28    136  |  98  |  18  ----------------------------<  164<H>  3.9   |  28  |  1.2    Ca    8.6      28 Jul 2021 08:45  Phos  3.5     07-28  Mg     2.0     07-28    TPro  6.2  /  Alb  3.6  /  TBili  1.4<H>  /  DBili  0.4<H>  /  AST  20  /  ALT  24  /  AlkPhos  67  07-28    CARDIAC MARKERS ( 27 Jul 2021 06:10 )  x     / <0.01 ng/mL / x     / x     / x      CARDIAC MARKERS ( 27 Jul 2021 00:57 )  x     / <0.01 ng/mL / x     / x     / x      CARDIAC MARKERS ( 26 Jul 2021 20:54 )  x     / <0.01 ng/mL / x     / x     / x          PT/INR - ( 27 Jul 2021 11:27 )   PT: 11.30 sec;   INR: 0.98 ratio         PTT - ( 27 Jul 2021 11:27 )  PTT:34.0 sec          CARDIAC MARKERS:  Troponin T, Serum: <0.01 ng/mL (07-27 @ 06:10)  Troponin T, Serum: <0.01 ng/mL (07-27 @ 00:57)  Troponin T, Serum: <0.01 ng/mL (07-26 @ 20:54)          TELEMETRY EVENTS: not on telemetry  ECG: normal sinus rhythm  CHEST X-RAY: no evidence of acute cardiopulmonary disease  OTHER:< from: [facility] (07.27.21 @ 21:55) >  IMPRESSION:    NO DEFINITE VISUALIZATION OF THE GALLBLADDER THROUGH 4 HOURS POST-INJECTION, CONSISTENT WITH CHOLECYSTITIS, AS DESCRIBED ABOVE.  CLINICALCORRELATION IS SUGGESTED.    < end of copied text >      PREVIOUS DIAGNOSTIC TESTING:  [  ] Echocardiogram  [  ] Catheterization  [X] Stress Test: within normal limits in March 2021 per patient  [  ] Coronary CTA

## 2021-07-28 NOTE — MEDICAL STUDENT PROGRESS NOTE(EDUCATION) - SUBJECTIVE AND OBJECTIVE BOX
CHRISTINA MARIO 71y Male  MRN#: [id number]      Pt is currently admitted with the primary diagnosis of cholelithiasis     SUBJECTIVE  Hospital Day: 1d  HPI:     Pt is a 72 yo male  with a pmhx of DM, HTN, BPH and Gastric Bypass who presented to the ED with sudden onset abdominal pain  Pt woke up with nausea and one episode of non bloody non bilious vomiting. Around 4pm pt reports RUQ and epigastric pain rate 8/10, non radiating, constant, pressure like, associated with anorexia. He did not have any chills, headache, diarrhea or constipation. No chest pain, palpitations or SOB. He did not have any similar episodes in the past. Patient reports drinking 2-3 shots of [place] on a daily basis. Pts last drink was night before presenting to the ED.     Hospital Course:    In the ED patient was found to be febrile with a temp of 102. Abdominal workup showed cholelithiasis on abdominal US. Surgery was consulted. Recommended HIDA scan for possible cholecystitis and possible laparoscopic cholecystectomy.     Overnight events: No acute overnight complaints.     Subjective complaints: No longer complaining of abdominal pain.     Present Today:   - :  No [ x ], Yes [   ] : Indication:     - Type of IV Access:         [X ] Peripheral                                             ----------------------------------------------------------  OBJECTIVE    Spiked fever with Tmax of 102 (5am 7/28/2021)      PAST MEDICAL & SURGICAL HISTORY  GERD (gastroesophageal reflux disease)    HTN (hypertension)    HLD (hyperlipidemia)    BPH (benign prostatic hyperplasia)    S/P gastric bypass                                              -----------------------------------------------------------  ALLERGIES:  sulfa drugs (Hives; Urticaria)                                            ------------------------------------------------------------    HOME MEDICATIONS  Home Medications:  aspirin 81 mg oral tablet: 1 tab(s) orally once a day (27 Jul 2021 03:59)  Januvia 25 mg oral tablet: 1 tab(s) orally once a day (27 Jul 2021 03:59)  simvastatin 20 mg oral tablet: 1 tab(s) orally once a day (at bedtime) (27 Jul 2021 03:59)  tamsulosin 0.4 mg oral capsule: 1 cap(s) orally once a day (27 Jul 2021 03:59)  valsartan-hydrochlorothiazide 320mg-25mg oral tablet: 1 tab(s) orally once a day (27 Jul 2021 03:59)                           MEDICATIONS:  STANDING MEDICATIONS  amLODIPine   Tablet 10 milliGRAM(s) Oral daily  ampicillin/sulbactam  IVPB      ampicillin/sulbactam  IVPB 3 Gram(s) IV Intermittent every 6 hours  aspirin enteric coated 81 milliGRAM(s) Oral daily  chlorhexidine 4% Liquid 1 Application(s) Topical daily  dextrose 40% Gel 15 Gram(s) Oral once  dextrose 5%. 1000 milliLiter(s) IV Continuous <Continuous>  dextrose 5%. 1000 milliLiter(s) IV Continuous <Continuous>  dextrose 50% Injectable 25 Gram(s) IV Push once  dextrose 50% Injectable 12.5 Gram(s) IV Push once  dextrose 50% Injectable 25 Gram(s) IV Push once  enoxaparin Injectable 40 milliGRAM(s) SubCutaneous daily  glucagon  Injectable 1 milliGRAM(s) IntraMuscular once  insulin glargine Injectable (LANTUS) 17 Unit(s) SubCutaneous at bedtime  insulin lispro (ADMELOG) corrective regimen sliding scale   SubCutaneous three times a day before meals  insulin lispro Injectable (ADMELOG) 6 Unit(s) SubCutaneous three times a day before meals  pantoprazole    Tablet 40 milliGRAM(s) Oral before breakfast  simvastatin 20 milliGRAM(s) Oral at bedtime  tamsulosin 0.4 milliGRAM(s) Oral at bedtime  valsartan 320 milliGRAM(s) Oral daily    PRN MEDICATIONS  acetaminophen   Tablet .. 650 milliGRAM(s) Oral every 6 hours PRN  traMADol 25 milliGRAM(s) Oral every 6 hours PRN                                            ------------------------------------------------------------  VITAL SIGNS: Last 24 Hours  T(C): 37.4 (28 Jul 2021 07:49), Max: 38.9 (28 Jul 2021 05:47)  T(F): 99.4 (28 Jul 2021 07:49), Max: 102 (28 Jul 2021 05:47)  HR: 83 (28 Jul 2021 07:49) (71 - 101)  BP: 127/69 (28 Jul 2021 07:49) (106/55 - 144/78)  BP(mean): --  RR: 18 (28 Jul 2021 05:47) (18 - 18)  SpO2: 95% (28 Jul 2021 07:49) (95% - 99%)                                             --------------------------------------------------------------  LABS:                        12.7   12.24 )-----------( 176      ( 27 Jul 2021 06:10 )             37.8     07-27    134<L>  |  95<L>  |  21<H>  ----------------------------<  256<H>  4.8   |  26  |  1.4    Ca    9.1      27 Jul 2021 06:10  Phos  2.6     07-27  Mg     1.9     07-27    TPro  6.7  /  Alb  4.3  /  TBili  1.0  /  DBili  x   /  AST  26  /  ALT  33  /  AlkPhos  76  07-27    PT/INR - ( 27 Jul 2021 11:27 )   PT: 11.30 sec;   INR: 0.98 ratio         PTT - ( 27 Jul 2021 11:27 )  PTT:34.0 sec              CARDIAC MARKERS ( 27 Jul 2021 06:10 )  x     / <0.01 ng/mL / x     / x     / x      CARDIAC MARKERS ( 27 Jul 2021 00:57 )  x     / <0.01 ng/mL / x     / x     / x      CARDIAC MARKERS ( 26 Jul 2021 20:54 )  x     / <0.01 ng/mL / x     / x     / x                                                  -------------------------------------------------------------  RADIOLOGY:  < from: US Abdomen Upper Quadrant Right (07.27.21 @ 01:43) >  IMPRESSION:    Cholelithiasis without definite evidence of cholecystitis. If clinically warranted, nuclear medicine HIDA scan may be of benefit.    < end of copied text >    < from: NM Hepatobiliary Imaging (07.27.21 @ 21:55) >  IMPRESSION:    NO DEFINITE VISUALIZATION OF THE GALLBLADDER THROUGH 4 HOURS POST-INJECTION, CONSISTENT WITH CHOLECYSTITIS, AS DESCRIBED ABOVE.  CLINICAL CORRELATION IS SUGGESTED.    < end of copied text >                                            --------------------------------------------------------------    PHYSICAL EXAM:    General: General: NAD,   Cardiac: RRR S1, S2, no Murmurs, rubs or gallops  Lungs: Clear to auscultation bilaterally, no wheeze, rhonchi or crackles  Abdomen: Soft, non-distended, non-tender, no rebound, no guarding, bowel sounds present. Negative Morrison's sign. Negative Rovsing   Neuro: AAOx3   Skin: No rashes or jaundice.                              CHRISTINA MARIO 71y Male  MRN#: [id number]      Pt is currently admitted with the primary diagnosis of cholelithiasis     SUBJECTIVE  Hospital Day: 1d  HPI: Pt is a 72 yo male  with a pmhx of DM, HTN, BPH and Gastric Bypass who presented to the ED with sudden onset abdominal pain  Pt woke up with nausea and one episode of non bloody non bilious vomiting. Around 4pm pt reports RUQ and epigastric pain rate 8/10, non radiating, constant, pressure like, associated with anorexia. He did not have any chills, headache, diarrhea or constipation. No chest pain, palpitations or SOB. He did not have any similar episodes in the past. Patient reports drinking 2-3 shots of [place] on a daily basis. Pts last drink was night before presenting to the ED.     Hospital Course:    In the ED patient was found to be febrile with a temp of 102. Abdominal workup showed cholelithiasis on abdominal US. Surgery was consulted. Recommended HIDA scan for possible cholecystitis and possible laparoscopic cholecystectomy.     Overnight events: No acute overnight complaints.     Subjective complaints: No longer complaining of abdominal pain.     Present Today:   - :  No [ x ], Yes [   ] : Indication:     - Type of IV Access:         [X ] Peripheral                                             ----------------------------------------------------------  OBJECTIVE    Spiked fever with Tmax of 102 (5am 7/28/2021)      PAST MEDICAL & SURGICAL HISTORY  GERD (gastroesophageal reflux disease)    HTN (hypertension)    HLD (hyperlipidemia)    BPH (benign prostatic hyperplasia)    S/P gastric bypass                                              -----------------------------------------------------------  ALLERGIES:  sulfa drugs (Hives; Urticaria)                                            ------------------------------------------------------------    HOME MEDICATIONS  Home Medications:  aspirin 81 mg oral tablet: 1 tab(s) orally once a day (27 Jul 2021 03:59)  Januvia 25 mg oral tablet: 1 tab(s) orally once a day (27 Jul 2021 03:59)  simvastatin 20 mg oral tablet: 1 tab(s) orally once a day (at bedtime) (27 Jul 2021 03:59)  tamsulosin 0.4 mg oral capsule: 1 cap(s) orally once a day (27 Jul 2021 03:59)  valsartan-hydrochlorothiazide 320mg-25mg oral tablet: 1 tab(s) orally once a day (27 Jul 2021 03:59)                           MEDICATIONS:  STANDING MEDICATIONS  amLODIPine   Tablet 10 milliGRAM(s) Oral daily  ampicillin/sulbactam  IVPB      ampicillin/sulbactam  IVPB 3 Gram(s) IV Intermittent every 6 hours  aspirin enteric coated 81 milliGRAM(s) Oral daily  chlorhexidine 4% Liquid 1 Application(s) Topical daily  dextrose 40% Gel 15 Gram(s) Oral once  dextrose 5%. 1000 milliLiter(s) IV Continuous <Continuous>  dextrose 5%. 1000 milliLiter(s) IV Continuous <Continuous>  dextrose 50% Injectable 25 Gram(s) IV Push once  dextrose 50% Injectable 12.5 Gram(s) IV Push once  dextrose 50% Injectable 25 Gram(s) IV Push once  enoxaparin Injectable 40 milliGRAM(s) SubCutaneous daily  glucagon  Injectable 1 milliGRAM(s) IntraMuscular once  insulin glargine Injectable (LANTUS) 17 Unit(s) SubCutaneous at bedtime  insulin lispro (ADMELOG) corrective regimen sliding scale   SubCutaneous three times a day before meals  insulin lispro Injectable (ADMELOG) 6 Unit(s) SubCutaneous three times a day before meals  pantoprazole    Tablet 40 milliGRAM(s) Oral before breakfast  simvastatin 20 milliGRAM(s) Oral at bedtime  tamsulosin 0.4 milliGRAM(s) Oral at bedtime  valsartan 320 milliGRAM(s) Oral daily    PRN MEDICATIONS  acetaminophen   Tablet .. 650 milliGRAM(s) Oral every 6 hours PRN  traMADol 25 milliGRAM(s) Oral every 6 hours PRN                                            ------------------------------------------------------------  VITAL SIGNS: Last 24 Hours  T(C): 37.4 (28 Jul 2021 07:49), Max: 38.9 (28 Jul 2021 05:47)  T(F): 99.4 (28 Jul 2021 07:49), Max: 102 (28 Jul 2021 05:47)  HR: 83 (28 Jul 2021 07:49) (71 - 101)  BP: 127/69 (28 Jul 2021 07:49) (106/55 - 144/78)  BP(mean): --  RR: 18 (28 Jul 2021 05:47) (18 - 18)  SpO2: 95% (28 Jul 2021 07:49) (95% - 99%)                                             --------------------------------------------------------------  LABS:                        12.7   12.24 )-----------( 176      ( 27 Jul 2021 06:10 )             37.8     07-27    134<L>  |  95<L>  |  21<H>  ----------------------------<  256<H>  4.8   |  26  |  1.4    Ca    9.1      27 Jul 2021 06:10  Phos  2.6     07-27  Mg     1.9     07-27    TPro  6.7  /  Alb  4.3  /  TBili  1.0  /  DBili  x   /  AST  26  /  ALT  33  /  AlkPhos  76  07-27    PT/INR - ( 27 Jul 2021 11:27 )   PT: 11.30 sec;   INR: 0.98 ratio         PTT - ( 27 Jul 2021 11:27 )  PTT:34.0 sec              CARDIAC MARKERS ( 27 Jul 2021 06:10 )  x     / <0.01 ng/mL / x     / x     / x      CARDIAC MARKERS ( 27 Jul 2021 00:57 )  x     / <0.01 ng/mL / x     / x     / x      CARDIAC MARKERS ( 26 Jul 2021 20:54 )  x     / <0.01 ng/mL / x     / x     / x                                                  -------------------------------------------------------------  RADIOLOGY:  < from: US Abdomen Upper Quadrant Right (07.27.21 @ 01:43) >  IMPRESSION:    Cholelithiasis without definite evidence of cholecystitis. If clinically warranted, nuclear medicine HIDA scan may be of benefit.    < end of copied text >    < from: NM Hepatobiliary Imaging (07.27.21 @ 21:55) >  IMPRESSION:    NO DEFINITE VISUALIZATION OF THE GALLBLADDER THROUGH 4 HOURS POST-INJECTION, CONSISTENT WITH CHOLECYSTITIS, AS DESCRIBED ABOVE.  CLINICAL CORRELATION IS SUGGESTED.    < end of copied text >                                            --------------------------------------------------------------    PHYSICAL EXAM:    General: General: NAD,   Cardiac: RRR S1, S2, no Murmurs, rubs or gallops  Lungs: Clear to auscultation bilaterally, no wheeze, rhonchi or crackles  Abdomen: Soft, non-distended, non-tender, no rebound, no guarding, bowel sounds present. Negative Morrison's sign. Negative Rovsing   Neuro: AAOx3   Skin: No rashes or jaundice.                              CHRISTINA MARIO 71y Male  MRN#: [id number]      Pt is currently admitted with the primary diagnosis of cholelithiasis     SUBJECTIVE  Hospital Day: 1d  HPI: Pt is a 70 yo male  with a pmhx of DM, HTN, BPH and Gastric Bypass (2012) who presented to the ED with sudden onset abdominal pain  Pt woke up with nausea and one episode of non bloody non bilious vomiting. Around 4pm pt reports RUQ and epigastric pain rate 8/10, non radiating, constant, pressure like, associated with anorexia. He did not have any chills, headache, diarrhea or constipation. No chest pain, palpitations or SOB. He did not have any similar episodes in the past. Patient reports drinking 2-3 shots of [place] on a daily basis. Pts last drink was night before presenting to the ED.     Hospital Course:    In the ED patient was found to be febrile with a temp of 102. Abdominal workup showed cholelithiasis on abdominal US. Surgery was consulted. Recommended HIDA scan for possible cholecystitis and possible laparoscopic cholecystectomy.       Interval Events:   Overnight events: No acute overnight complaints.     Subjective complaints: Pt sitting comfortably in bed. No longer complaining of abdominal pain. Patient spiked fever in the morning. Was given Tylenol.     Present Today:   - :  No [ x ], Yes [   ] : Indication:     - Type of IV Access:         [X ] Peripheral                                             ----------------------------------------------------------  OBJECTIVE    Spiked fever with Tmax of 102 (5am 7/28/2021)      PAST MEDICAL & SURGICAL HISTORY  GERD (gastroesophageal reflux disease)    HTN (hypertension)    HLD (hyperlipidemia)    BPH (benign prostatic hyperplasia)    S/P gastric bypass                                              -----------------------------------------------------------  ALLERGIES:  sulfa drugs (Hives; Urticaria)                                            ------------------------------------------------------------    HOME MEDICATIONS  Home Medications:  aspirin 81 mg oral tablet: 1 tab(s) orally once a day (27 Jul 2021 03:59)  Januvia 25 mg oral tablet: 1 tab(s) orally once a day (27 Jul 2021 03:59)  simvastatin 20 mg oral tablet: 1 tab(s) orally once a day (at bedtime) (27 Jul 2021 03:59)  tamsulosin 0.4 mg oral capsule: 1 cap(s) orally once a day (27 Jul 2021 03:59)  valsartan-hydrochlorothiazide 320mg-25mg oral tablet: 1 tab(s) orally once a day (27 Jul 2021 03:59)                           MEDICATIONS:  STANDING MEDICATIONS  amLODIPine   Tablet 10 milliGRAM(s) Oral daily  ampicillin/sulbactam  IVPB      ampicillin/sulbactam  IVPB 3 Gram(s) IV Intermittent every 6 hours  aspirin enteric coated 81 milliGRAM(s) Oral daily  chlorhexidine 4% Liquid 1 Application(s) Topical daily  dextrose 40% Gel 15 Gram(s) Oral once  dextrose 5%. 1000 milliLiter(s) IV Continuous <Continuous>  dextrose 5%. 1000 milliLiter(s) IV Continuous <Continuous>  dextrose 50% Injectable 25 Gram(s) IV Push once  dextrose 50% Injectable 12.5 Gram(s) IV Push once  dextrose 50% Injectable 25 Gram(s) IV Push once  enoxaparin Injectable 40 milliGRAM(s) SubCutaneous daily  glucagon  Injectable 1 milliGRAM(s) IntraMuscular once  insulin glargine Injectable (LANTUS) 17 Unit(s) SubCutaneous at bedtime  insulin lispro (ADMELOG) corrective regimen sliding scale   SubCutaneous three times a day before meals  insulin lispro Injectable (ADMELOG) 6 Unit(s) SubCutaneous three times a day before meals  pantoprazole    Tablet 40 milliGRAM(s) Oral before breakfast  simvastatin 20 milliGRAM(s) Oral at bedtime  tamsulosin 0.4 milliGRAM(s) Oral at bedtime  valsartan 320 milliGRAM(s) Oral daily    PRN MEDICATIONS  acetaminophen   Tablet .. 650 milliGRAM(s) Oral every 6 hours PRN  traMADol 25 milliGRAM(s) Oral every 6 hours PRN                                            ------------------------------------------------------------  VITAL SIGNS: Last 24 Hours  T(C): 37.4 (28 Jul 2021 07:49), Max: 38.9 (28 Jul 2021 05:47)  T(F): 99.4 (28 Jul 2021 07:49), Max: 102 (28 Jul 2021 05:47)  HR: 83 (28 Jul 2021 07:49) (71 - 101)  BP: 127/69 (28 Jul 2021 07:49) (106/55 - 144/78)  BP(mean): --  RR: 18 (28 Jul 2021 05:47) (18 - 18)  SpO2: 95% (28 Jul 2021 07:49) (95% - 99%)                                             --------------------------------------------------------------  LABS:                        12.7   12.24 )-----------( 176      ( 27 Jul 2021 06:10 )             37.8     07-27    134<L>  |  95<L>  |  21<H>  ----------------------------<  256<H>  4.8   |  26  |  1.4    Ca    9.1      27 Jul 2021 06:10  Phos  2.6     07-27  Mg     1.9     07-27    TPro  6.7  /  Alb  4.3  /  TBili  1.0  /  DBili  x   /  AST  26  /  ALT  33  /  AlkPhos  76  07-27    PT/INR - ( 27 Jul 2021 11:27 )   PT: 11.30 sec;   INR: 0.98 ratio         PTT - ( 27 Jul 2021 11:27 )  PTT:34.0 sec              CARDIAC MARKERS ( 27 Jul 2021 06:10 )  x     / <0.01 ng/mL / x     / x     / x      CARDIAC MARKERS ( 27 Jul 2021 00:57 )  x     / <0.01 ng/mL / x     / x     / x      CARDIAC MARKERS ( 26 Jul 2021 20:54 )  x     / <0.01 ng/mL / x     / x     / x                                                  -------------------------------------------------------------  RADIOLOGY:    US Abdomen RUQ    < from: US Abdomen Upper Quadrant Right (07.27.21 @ 01:43) >  IMPRESSION:    Cholelithiasis without definite evidence of cholecystitis. If clinically warranted, nuclear medicine HIDA scan may be of benefit.    < end of copied text >      HIDA Scan  < from: NM Hepatobiliary Imaging (07.27.21 @ 21:55) >  IMPRESSION:    NO DEFINITE VISUALIZATION OF THE GALLBLADDER THROUGH 4 HOURS POST-INJECTION, CONSISTENT WITH CHOLECYSTITIS, AS DESCRIBED ABOVE.  CLINICAL CORRELATION IS SUGGESTED.    < end of copied text >                                                --------------------------------------------------------------    PHYSICAL EXAM:    General: NAD, well-appearing  Cardiac: RRR S1, S2, no Murmurs, rubs or gallops  Lungs: Clear to auscultation bilaterally, no wheeze, rhonchi or crackles  Abdomen: Soft, non-distended, non-tender, no rebound, no guarding, bowel sounds present. Negative Morrison's sign. Negative Rovsing   Neuro: nonfocal, AAOx3   Musculoskeletal: moves all extremities  Skin: No rashes or jaundice.                              CHRISTINA MARIO 71y Male  MRN#: [id number]      Pt is currently admitted with the primary diagnosis of cholelithiasis     SUBJECTIVE  Hospital Day: 1d  HPI: Pt is a 70 yo male  with a pmhx of DM, HTN, BPH and Gastric Bypass (2012) who presented to the ED with sudden onset abdominal pain  Pt woke up with nausea and one episode of non bloody non bilious vomiting. Around 4pm pt reports RUQ and epigastric pain rate 8/10, non radiating, constant, pressure like, associated with anorexia. He did not have any chills, headache, diarrhea or constipation. No chest pain, palpitations or SOB. He did not have any similar episodes in the past. Patient reports drinking 2-3 shots of [place] on a daily basis. Pts last drink was night before presenting to the ED.     Hospital Course:    In the ED patient was found to be febrile with a temp of 102. Abdominal workup showed cholelithiasis on abdominal US. Surgery was consulted. Recommended HIDA scan for possible cholecystitis and possible laparoscopic cholecystectomy.       Interval Events:   Overnight events: No acute overnight complaints.     Subjective complaints: Pt sitting comfortably in bed. No longer complaining of abdominal pain. Patient spiked fever in the morning. Was given Tylenol.     Present Today:   - :  No [ x ], Yes [   ] : Indication:     - Type of IV Access:         [X ] Peripheral                                             ----------------------------------------------------------  OBJECTIVE    Spiked fever with Tmax of 102 (5am 7/28/2021)      PAST MEDICAL & SURGICAL HISTORY  GERD (gastroesophageal reflux disease)    HTN (hypertension)    HLD (hyperlipidemia)    BPH (benign prostatic hyperplasia)    S/P gastric bypass                                              -----------------------------------------------------------  ALLERGIES:  sulfa drugs (Hives; Urticaria)                                            ------------------------------------------------------------    HOME MEDICATIONS  Home Medications:  aspirin 81 mg oral tablet: 1 tab(s) orally once a day (27 Jul 2021 03:59)  Januvia 25 mg oral tablet: 1 tab(s) orally once a day (27 Jul 2021 03:59)  simvastatin 20 mg oral tablet: 1 tab(s) orally once a day (at bedtime) (27 Jul 2021 03:59)  tamsulosin 0.4 mg oral capsule: 1 cap(s) orally once a day (27 Jul 2021 03:59)  valsartan-hydrochlorothiazide 320mg-25mg oral tablet: 1 tab(s) orally once a day (27 Jul 2021 03:59)                           MEDICATIONS:  STANDING MEDICATIONS  amLODIPine   Tablet 10 milliGRAM(s) Oral daily  ampicillin/sulbactam  IVPB      ampicillin/sulbactam  IVPB 3 Gram(s) IV Intermittent every 6 hours  aspirin enteric coated 81 milliGRAM(s) Oral daily  chlorhexidine 4% Liquid 1 Application(s) Topical daily  dextrose 40% Gel 15 Gram(s) Oral once  dextrose 5%. 1000 milliLiter(s) IV Continuous <Continuous>  dextrose 5%. 1000 milliLiter(s) IV Continuous <Continuous>  dextrose 50% Injectable 25 Gram(s) IV Push once  dextrose 50% Injectable 12.5 Gram(s) IV Push once  dextrose 50% Injectable 25 Gram(s) IV Push once  enoxaparin Injectable 40 milliGRAM(s) SubCutaneous daily  glucagon  Injectable 1 milliGRAM(s) IntraMuscular once  insulin glargine Injectable (LANTUS) 17 Unit(s) SubCutaneous at bedtime  insulin lispro (ADMELOG) corrective regimen sliding scale   SubCutaneous three times a day before meals  insulin lispro Injectable (ADMELOG) 6 Unit(s) SubCutaneous three times a day before meals  pantoprazole    Tablet 40 milliGRAM(s) Oral before breakfast  simvastatin 20 milliGRAM(s) Oral at bedtime  tamsulosin 0.4 milliGRAM(s) Oral at bedtime  valsartan 320 milliGRAM(s) Oral daily    PRN MEDICATIONS  acetaminophen   Tablet .. 650 milliGRAM(s) Oral every 6 hours PRN  traMADol 25 milliGRAM(s) Oral every 6 hours PRN                                            ------------------------------------------------------------  VITAL SIGNS: Last 24 Hours  T(C): 37.4 (28 Jul 2021 07:49), Max: 38.9 (28 Jul 2021 05:47)  T(F): 99.4 (28 Jul 2021 07:49), Max: 102 (28 Jul 2021 05:47)  HR: 83 (28 Jul 2021 07:49) (71 - 101)  BP: 127/69 (28 Jul 2021 07:49) (106/55 - 144/78)  BP(mean): --  RR: 18 (28 Jul 2021 05:47) (18 - 18)  SpO2: 95% (28 Jul 2021 07:49) (95% - 99%)                                             --------------------------------------------------------------  LABS:                        12.7   12.24 )-----------( 176      ( 27 Jul 2021 06:10 )             37.8     07-27    134<L>  |  95<L>  |  21<H>  ----------------------------<  256<H>  4.8   |  26  |  1.4    Ca    9.1      27 Jul 2021 06:10  Phos  2.6     07-27  Mg     1.9     07-27    TPro  6.7  /  Alb  4.3  /  TBili  1.0  /  DBili  x   /  AST  26  /  ALT  33  /  AlkPhos  76  07-27    PT/INR - ( 27 Jul 2021 11:27 )   PT: 11.30 sec;   INR: 0.98 ratio         PTT - ( 27 Jul 2021 11:27 )  PTT:34.0 sec              CARDIAC MARKERS ( 27 Jul 2021 06:10 )  x     / <0.01 ng/mL / x     / x     / x      CARDIAC MARKERS ( 27 Jul 2021 00:57 )  x     / <0.01 ng/mL / x     / x     / x      CARDIAC MARKERS ( 26 Jul 2021 20:54 )  x     / <0.01 ng/mL / x     / x     / x                                                  -------------------------------------------------------------  RADIOLOGY:    US Abdomen RUQ    < from: US Abdomen Upper Quadrant Right (07.27.21 @ 01:43) >  IMPRESSION:    Cholelithiasis without definite evidence of cholecystitis. If clinically warranted, nuclear medicine HIDA scan may be of benefit.    < end of copied text >      HIDA Scan  < from: NM Hepatobiliary Imaging (07.27.21 @ 21:55) >  IMPRESSION:    NO DEFINITE VISUALIZATION OF THE GALLBLADDER THROUGH 4 HOURS POST-INJECTION, CONSISTENT WITH CHOLECYSTITIS, AS DESCRIBED ABOVE.  CLINICAL CORRELATION IS SUGGESTED.    < end of copied text >                                                --------------------------------------------------------------    PHYSICAL EXAM:    General: NAD, well-appearing  Cardiac: RRR S1, S2, no Murmurs, rubs or gallops  Lungs: Clear to auscultation bilaterally, no wheeze, rhonchi or crackles  Abdomen: Soft, non-distended, non-tender, no rebound, no guarding, bowel sounds present. Negative Morrison's sign. Negative Rovsing   Neuro: nonfocal, AAOx3   Musculoskeletal: moves all extremities  Skin: No rashes or jaundice.

## 2021-07-29 ENCOUNTER — RESULT REVIEW (OUTPATIENT)
Age: 71
End: 2021-07-29

## 2021-07-29 LAB
ALBUMIN SERPL ELPH-MCNC: 3.2 G/DL — LOW (ref 3.5–5.2)
ALP SERPL-CCNC: 190 U/L — HIGH (ref 30–115)
ALT FLD-CCNC: 108 U/L — HIGH (ref 0–41)
ANION GAP SERPL CALC-SCNC: 14 MMOL/L — SIGNIFICANT CHANGE UP (ref 7–14)
AST SERPL-CCNC: 252 U/L — HIGH (ref 0–41)
BASOPHILS # BLD AUTO: 0.01 K/UL — SIGNIFICANT CHANGE UP (ref 0–0.2)
BASOPHILS NFR BLD AUTO: 0.1 % — SIGNIFICANT CHANGE UP (ref 0–1)
BILIRUB DIRECT SERPL-MCNC: 0.7 MG/DL — HIGH (ref 0–0.2)
BILIRUB INDIRECT FLD-MCNC: 0.4 MG/DL — SIGNIFICANT CHANGE UP (ref 0.2–1.2)
BILIRUB SERPL-MCNC: 1.1 MG/DL — SIGNIFICANT CHANGE UP (ref 0.2–1.2)
BUN SERPL-MCNC: 20 MG/DL — SIGNIFICANT CHANGE UP (ref 10–20)
CALCIUM SERPL-MCNC: 8.2 MG/DL — LOW (ref 8.5–10.1)
CHLORIDE SERPL-SCNC: 94 MMOL/L — LOW (ref 98–110)
CO2 SERPL-SCNC: 22 MMOL/L — SIGNIFICANT CHANGE UP (ref 17–32)
COVID-19 SPIKE DOMAIN AB INTERP: POSITIVE
COVID-19 SPIKE DOMAIN ANTIBODY RESULT: 224 U/ML — HIGH
CREAT SERPL-MCNC: 1 MG/DL — SIGNIFICANT CHANGE UP (ref 0.7–1.5)
EOSINOPHIL # BLD AUTO: 0.01 K/UL — SIGNIFICANT CHANGE UP (ref 0–0.7)
EOSINOPHIL NFR BLD AUTO: 0.1 % — SIGNIFICANT CHANGE UP (ref 0–8)
FERRITIN SERPL-MCNC: 924 NG/ML — HIGH (ref 30–400)
FOLATE SERPL-MCNC: >20 NG/ML — SIGNIFICANT CHANGE UP
GLUCOSE BLDC GLUCOMTR-MCNC: 132 MG/DL — HIGH (ref 70–99)
GLUCOSE BLDC GLUCOMTR-MCNC: 283 MG/DL — HIGH (ref 70–99)
GLUCOSE SERPL-MCNC: 191 MG/DL — HIGH (ref 70–99)
HCT VFR BLD CALC: 33.7 % — LOW (ref 42–52)
HGB BLD-MCNC: 11 G/DL — LOW (ref 14–18)
IMM GRANULOCYTES NFR BLD AUTO: 0.4 % — HIGH (ref 0.1–0.3)
LYMPHOCYTES # BLD AUTO: 1.67 K/UL — SIGNIFICANT CHANGE UP (ref 1.2–3.4)
LYMPHOCYTES # BLD AUTO: 18.6 % — LOW (ref 20.5–51.1)
MAGNESIUM SERPL-MCNC: 1.8 MG/DL — SIGNIFICANT CHANGE UP (ref 1.8–2.4)
MCHC RBC-ENTMCNC: 31.2 PG — HIGH (ref 27–31)
MCHC RBC-ENTMCNC: 32.6 G/DL — SIGNIFICANT CHANGE UP (ref 32–37)
MCV RBC AUTO: 95.5 FL — HIGH (ref 80–94)
MONOCYTES # BLD AUTO: 0.47 K/UL — SIGNIFICANT CHANGE UP (ref 0.1–0.6)
MONOCYTES NFR BLD AUTO: 5.2 % — SIGNIFICANT CHANGE UP (ref 1.7–9.3)
NEUTROPHILS # BLD AUTO: 6.79 K/UL — HIGH (ref 1.4–6.5)
NEUTROPHILS NFR BLD AUTO: 75.6 % — HIGH (ref 42.2–75.2)
NRBC # BLD: 0 /100 WBCS — SIGNIFICANT CHANGE UP (ref 0–0)
PHOSPHATE SERPL-MCNC: 3.9 MG/DL — SIGNIFICANT CHANGE UP (ref 2.1–4.9)
PLATELET # BLD AUTO: 130 K/UL — SIGNIFICANT CHANGE UP (ref 130–400)
POTASSIUM SERPL-MCNC: 4.5 MMOL/L — SIGNIFICANT CHANGE UP (ref 3.5–5)
POTASSIUM SERPL-SCNC: 4.5 MMOL/L — SIGNIFICANT CHANGE UP (ref 3.5–5)
PROT SERPL-MCNC: 5.7 G/DL — LOW (ref 6–8)
RBC # BLD: 3.53 M/UL — LOW (ref 4.7–6.1)
RBC # FLD: 12.7 % — SIGNIFICANT CHANGE UP (ref 11.5–14.5)
SARS-COV-2 IGG+IGM SERPL QL IA: 224 U/ML — HIGH
SARS-COV-2 IGG+IGM SERPL QL IA: POSITIVE
SARS-COV-2 RNA SPEC QL NAA+PROBE: SIGNIFICANT CHANGE UP
SODIUM SERPL-SCNC: 130 MMOL/L — LOW (ref 135–146)
VIT B12 SERPL-MCNC: 510 PG/ML — SIGNIFICANT CHANGE UP (ref 232–1245)
WBC # BLD: 8.99 K/UL — SIGNIFICANT CHANGE UP (ref 4.8–10.8)
WBC # FLD AUTO: 8.99 K/UL — SIGNIFICANT CHANGE UP (ref 4.8–10.8)

## 2021-07-29 PROCEDURE — 88304 TISSUE EXAM BY PATHOLOGIST: CPT | Mod: 26

## 2021-07-29 PROCEDURE — 47562 LAPAROSCOPIC CHOLECYSTECTOMY: CPT

## 2021-07-29 PROCEDURE — 93306 TTE W/DOPPLER COMPLETE: CPT | Mod: 26

## 2021-07-29 PROCEDURE — 99222 1ST HOSP IP/OBS MODERATE 55: CPT

## 2021-07-29 RX ORDER — INSULIN LISPRO 100/ML
6 VIAL (ML) SUBCUTANEOUS
Refills: 0 | Status: DISCONTINUED | OUTPATIENT
Start: 2021-07-29 | End: 2021-07-30

## 2021-07-29 RX ORDER — VALSARTAN 80 MG/1
320 TABLET ORAL DAILY
Refills: 0 | Status: DISCONTINUED | OUTPATIENT
Start: 2021-07-29 | End: 2021-07-30

## 2021-07-29 RX ORDER — HYDROMORPHONE HYDROCHLORIDE 2 MG/ML
0.5 INJECTION INTRAMUSCULAR; INTRAVENOUS; SUBCUTANEOUS
Refills: 0 | Status: DISCONTINUED | OUTPATIENT
Start: 2021-07-29 | End: 2021-07-30

## 2021-07-29 RX ORDER — TRAMADOL HYDROCHLORIDE 50 MG/1
25 TABLET ORAL EVERY 6 HOURS
Refills: 0 | Status: DISCONTINUED | OUTPATIENT
Start: 2021-07-29 | End: 2021-07-30

## 2021-07-29 RX ORDER — CHLORHEXIDINE GLUCONATE 213 G/1000ML
1 SOLUTION TOPICAL DAILY
Refills: 0 | Status: DISCONTINUED | OUTPATIENT
Start: 2021-07-29 | End: 2021-07-30

## 2021-07-29 RX ORDER — DEXTROSE 50 % IN WATER 50 %
15 SYRINGE (ML) INTRAVENOUS ONCE
Refills: 0 | Status: DISCONTINUED | OUTPATIENT
Start: 2021-07-29 | End: 2021-07-30

## 2021-07-29 RX ORDER — ONDANSETRON 8 MG/1
4 TABLET, FILM COATED ORAL ONCE
Refills: 0 | Status: DISCONTINUED | OUTPATIENT
Start: 2021-07-29 | End: 2021-07-30

## 2021-07-29 RX ORDER — DEXTROSE 50 % IN WATER 50 %
25 SYRINGE (ML) INTRAVENOUS ONCE
Refills: 0 | Status: DISCONTINUED | OUTPATIENT
Start: 2021-07-29 | End: 2021-07-30

## 2021-07-29 RX ORDER — SODIUM CHLORIDE 9 MG/ML
1000 INJECTION, SOLUTION INTRAVENOUS
Refills: 0 | Status: DISCONTINUED | OUTPATIENT
Start: 2021-07-29 | End: 2021-07-30

## 2021-07-29 RX ORDER — PANTOPRAZOLE SODIUM 20 MG/1
40 TABLET, DELAYED RELEASE ORAL
Refills: 0 | Status: DISCONTINUED | OUTPATIENT
Start: 2021-07-29 | End: 2021-07-30

## 2021-07-29 RX ORDER — INSULIN LISPRO 100/ML
VIAL (ML) SUBCUTANEOUS
Refills: 0 | Status: DISCONTINUED | OUTPATIENT
Start: 2021-07-29 | End: 2021-07-30

## 2021-07-29 RX ORDER — DEXTROSE 50 % IN WATER 50 %
12.5 SYRINGE (ML) INTRAVENOUS ONCE
Refills: 0 | Status: DISCONTINUED | OUTPATIENT
Start: 2021-07-29 | End: 2021-07-30

## 2021-07-29 RX ORDER — INSULIN GLARGINE 100 [IU]/ML
17 INJECTION, SOLUTION SUBCUTANEOUS AT BEDTIME
Refills: 0 | Status: DISCONTINUED | OUTPATIENT
Start: 2021-07-29 | End: 2021-07-30

## 2021-07-29 RX ORDER — ACETAMINOPHEN 500 MG
650 TABLET ORAL EVERY 6 HOURS
Refills: 0 | Status: DISCONTINUED | OUTPATIENT
Start: 2021-07-29 | End: 2021-07-30

## 2021-07-29 RX ORDER — SODIUM CHLORIDE 9 MG/ML
500 INJECTION, SOLUTION INTRAVENOUS
Refills: 0 | Status: DISCONTINUED | OUTPATIENT
Start: 2021-07-29 | End: 2021-07-30

## 2021-07-29 RX ORDER — GLUCAGON INJECTION, SOLUTION 0.5 MG/.1ML
1 INJECTION, SOLUTION SUBCUTANEOUS ONCE
Refills: 0 | Status: DISCONTINUED | OUTPATIENT
Start: 2021-07-29 | End: 2021-07-30

## 2021-07-29 RX ORDER — ACETAMINOPHEN 500 MG
650 TABLET ORAL EVERY 6 HOURS
Refills: 0 | Status: DISCONTINUED | OUTPATIENT
Start: 2021-07-29 | End: 2021-07-29

## 2021-07-29 RX ORDER — AMPICILLIN SODIUM AND SULBACTAM SODIUM 250; 125 MG/ML; MG/ML
3 INJECTION, POWDER, FOR SUSPENSION INTRAMUSCULAR; INTRAVENOUS EVERY 6 HOURS
Refills: 0 | Status: DISCONTINUED | OUTPATIENT
Start: 2021-07-29 | End: 2021-07-30

## 2021-07-29 RX ORDER — AMLODIPINE BESYLATE 2.5 MG/1
10 TABLET ORAL DAILY
Refills: 0 | Status: DISCONTINUED | OUTPATIENT
Start: 2021-07-29 | End: 2021-07-30

## 2021-07-29 RX ORDER — TAMSULOSIN HYDROCHLORIDE 0.4 MG/1
0.4 CAPSULE ORAL AT BEDTIME
Refills: 0 | Status: DISCONTINUED | OUTPATIENT
Start: 2021-07-29 | End: 2021-07-30

## 2021-07-29 RX ORDER — SIMVASTATIN 20 MG/1
20 TABLET, FILM COATED ORAL AT BEDTIME
Refills: 0 | Status: DISCONTINUED | OUTPATIENT
Start: 2021-07-29 | End: 2021-07-30

## 2021-07-29 RX ORDER — ASPIRIN/CALCIUM CARB/MAGNESIUM 324 MG
81 TABLET ORAL DAILY
Refills: 0 | Status: DISCONTINUED | OUTPATIENT
Start: 2021-07-29 | End: 2021-07-30

## 2021-07-29 RX ORDER — ENOXAPARIN SODIUM 100 MG/ML
40 INJECTION SUBCUTANEOUS DAILY
Refills: 0 | Status: DISCONTINUED | OUTPATIENT
Start: 2021-07-29 | End: 2021-07-30

## 2021-07-29 RX ORDER — SODIUM CHLORIDE 9 MG/ML
1000 INJECTION, SOLUTION INTRAVENOUS ONCE
Refills: 0 | Status: DISCONTINUED | OUTPATIENT
Start: 2021-07-29 | End: 2021-07-30

## 2021-07-29 RX ADMIN — AMPICILLIN SODIUM AND SULBACTAM SODIUM 200 GRAM(S): 250; 125 INJECTION, POWDER, FOR SUSPENSION INTRAMUSCULAR; INTRAVENOUS at 23:30

## 2021-07-29 RX ADMIN — TAMSULOSIN HYDROCHLORIDE 0.4 MILLIGRAM(S): 0.4 CAPSULE ORAL at 22:19

## 2021-07-29 RX ADMIN — SODIUM CHLORIDE 50 MILLILITER(S): 9 INJECTION, SOLUTION INTRAVENOUS at 19:00

## 2021-07-29 RX ADMIN — SIMVASTATIN 20 MILLIGRAM(S): 20 TABLET, FILM COATED ORAL at 22:24

## 2021-07-29 RX ADMIN — Medication 650 MILLIGRAM(S): at 23:30

## 2021-07-29 RX ADMIN — AMPICILLIN SODIUM AND SULBACTAM SODIUM 200 GRAM(S): 250; 125 INJECTION, POWDER, FOR SUSPENSION INTRAMUSCULAR; INTRAVENOUS at 05:01

## 2021-07-29 RX ADMIN — Medication 650 MILLIGRAM(S): at 18:26

## 2021-07-29 RX ADMIN — ENOXAPARIN SODIUM 40 MILLIGRAM(S): 100 INJECTION SUBCUTANEOUS at 22:17

## 2021-07-29 RX ADMIN — Medication 81 MILLIGRAM(S): at 22:16

## 2021-07-29 RX ADMIN — AMPICILLIN SODIUM AND SULBACTAM SODIUM 200 GRAM(S): 250; 125 INJECTION, POWDER, FOR SUSPENSION INTRAMUSCULAR; INTRAVENOUS at 18:26

## 2021-07-29 RX ADMIN — Medication 650 MILLIGRAM(S): at 18:56

## 2021-07-29 RX ADMIN — INSULIN GLARGINE 17 UNIT(S): 100 INJECTION, SOLUTION SUBCUTANEOUS at 22:17

## 2021-07-29 NOTE — CHART NOTE - NSCHARTNOTEFT_GEN_A_CORE
Post Operative Note  Patient: CHRISTINA MARIO 71y (1950) Male   MRN: [id number]  Location: [address]  Visit: 07-27-21 Inpatient  Date: 07-29-21 @ 21:40    Procedure: Acute cholecystitis     S/P Laparoscopic cholecystectomy        Subjective:   Nausea: no, Vomiting: no, Ambulating:  no, Flatus: no  Pain Assessment: Patient is complaining of MILD pain that is appropriate for post-operative course.     Objective:  Vitals: T(F): 97.5 (07-29-21 @ 20:00), Max: 98.7 (07-29-21 @ 05:00)  HR: 68 (07-29-21 @ 20:00)  BP: 136/69 (07-29-21 @ 20:00) (106/52 - 136/69)  RR: 17 (07-29-21 @ 20:00)  SpO2: 95% (07-29-21 @ 20:00)  Vent Settings:     In:   07-28-21 @ 07:01  -  07-29-21 @ 07:00  --------------------------------------------------------  IN: 1300 mL      IV Fluids: dextrose 5%. 1000 milliLiter(s) (50 mL/Hr) IV Continuous <Continuous>  dextrose 5%. 1000 milliLiter(s) (100 mL/Hr) IV Continuous <Continuous>  lactated ringers Bolus 1000 milliLiter(s) IV Bolus once  lactated ringers. 500 milliLiter(s) (50 mL/Hr) IV Continuous <Continuous>  lactated ringers. 1000 milliLiter(s) (75 mL/Hr) IV Continuous <Continuous>      Out:   07-28-21 @ 07:01  -  07-29-21 @ 07:00  --------------------------------------------------------  OUT: 0 mL      EBL:     Voided Urine:   07-28-21 @ 07:01  -  07-29-21 @ 07:00  --------------------------------------------------------  OUT: 0 mL       Catheter: no          Physical Examination:  General Appearance: NAD  HEENT: EOMI, sclera non-icteric.  Heart: RRR  Lungs: CTABL  Abdomen:  Soft, MILDLY tender, appropriate for post-op course, nondistended. No rigidity, guarding, or rebound tenderness.   MSK/Extremities: Warm & well-perfused. Peripheral pulses intact.  Skin: Warm, dry. No jaundice.   Incisions/Wounds: Dressings in place, clean, dry and intact, no signs of infection/active bleeding/drainage. incisions covered with dermabond skin glue    Medications: [Standing]  acetaminophen   Tablet .. 650 milliGRAM(s) Oral every 6 hours  amLODIPine   Tablet 10 milliGRAM(s) Oral daily  ampicillin/sulbactam  IVPB 3 Gram(s) IV Intermittent every 6 hours  aspirin enteric coated 81 milliGRAM(s) Oral daily  chlorhexidine 4% Liquid 1 Application(s) Topical daily  dextrose 40% Gel 15 Gram(s) Oral once  dextrose 5%. 1000 milliLiter(s) IV Continuous <Continuous>  dextrose 5%. 1000 milliLiter(s) IV Continuous <Continuous>  dextrose 50% Injectable 25 Gram(s) IV Push once  dextrose 50% Injectable 12.5 Gram(s) IV Push once  dextrose 50% Injectable 25 Gram(s) IV Push once  enoxaparin Injectable 40 milliGRAM(s) SubCutaneous daily  glucagon  Injectable 1 milliGRAM(s) IntraMuscular once  HYDROmorphone  Injectable 0.5 milliGRAM(s) IV Push every 10 minutes PRN  insulin glargine Injectable (LANTUS) 17 Unit(s) SubCutaneous at bedtime  insulin lispro (ADMELOG) corrective regimen sliding scale   SubCutaneous three times a day before meals  insulin lispro Injectable (ADMELOG) 6 Unit(s) SubCutaneous three times a day before meals  lactated ringers Bolus 1000 milliLiter(s) IV Bolus once  lactated ringers. 500 milliLiter(s) IV Continuous <Continuous>  lactated ringers. 1000 milliLiter(s) IV Continuous <Continuous>  ondansetron Injectable 4 milliGRAM(s) IV Push once PRN  pantoprazole    Tablet 40 milliGRAM(s) Oral before breakfast  simvastatin 20 milliGRAM(s) Oral at bedtime  tamsulosin 0.4 milliGRAM(s) Oral at bedtime  traMADol 25 milliGRAM(s) Oral every 6 hours PRN  valsartan 320 milliGRAM(s) Oral daily    Medications: [PRN]  acetaminophen   Tablet .. 650 milliGRAM(s) Oral every 6 hours  amLODIPine   Tablet 10 milliGRAM(s) Oral daily  ampicillin/sulbactam  IVPB 3 Gram(s) IV Intermittent every 6 hours  aspirin enteric coated 81 milliGRAM(s) Oral daily  chlorhexidine 4% Liquid 1 Application(s) Topical daily  dextrose 40% Gel 15 Gram(s) Oral once  dextrose 5%. 1000 milliLiter(s) IV Continuous <Continuous>  dextrose 5%. 1000 milliLiter(s) IV Continuous <Continuous>  dextrose 50% Injectable 25 Gram(s) IV Push once  dextrose 50% Injectable 12.5 Gram(s) IV Push once  dextrose 50% Injectable 25 Gram(s) IV Push once  enoxaparin Injectable 40 milliGRAM(s) SubCutaneous daily  glucagon  Injectable 1 milliGRAM(s) IntraMuscular once  HYDROmorphone  Injectable 0.5 milliGRAM(s) IV Push every 10 minutes PRN  insulin glargine Injectable (LANTUS) 17 Unit(s) SubCutaneous at bedtime  insulin lispro (ADMELOG) corrective regimen sliding scale   SubCutaneous three times a day before meals  insulin lispro Injectable (ADMELOG) 6 Unit(s) SubCutaneous three times a day before meals  lactated ringers Bolus 1000 milliLiter(s) IV Bolus once  lactated ringers. 500 milliLiter(s) IV Continuous <Continuous>  lactated ringers. 1000 milliLiter(s) IV Continuous <Continuous>  ondansetron Injectable 4 milliGRAM(s) IV Push once PRN  pantoprazole    Tablet 40 milliGRAM(s) Oral before breakfast  simvastatin 20 milliGRAM(s) Oral at bedtime  tamsulosin 0.4 milliGRAM(s) Oral at bedtime  traMADol 25 milliGRAM(s) Oral every 6 hours PRN  valsartan 320 milliGRAM(s) Oral daily      DVT PROPHYLAXIS: enoxaparin Injectable 40 milliGRAM(s) SubCutaneous daily    GI PROPHYLAXIS: pantoprazole    Tablet 40 milliGRAM(s) Oral before breakfast    ANTICOAGULATION:   ANTIBIOTICS:  ampicillin/sulbactam  IVPB 3 Gram(s)        Labs:                        11.0   8.99  )-----------( 130      ( 29 Jul 2021 20:26 )             33.7     07-29    130<L>  |  94<L>  |  20  ----------------------------<  191<H>  4.5   |  22  |  1.0    Ca    8.2<L>      29 Jul 2021 20:26  Phos  3.9     07-29  Mg     1.8     07-29    TPro  5.7<L>  /  Alb  3.2<L>  /  TBili  1.1  /  DBili  0.7<H>  /  AST  252<H>  /  ALT  108<H>  /  AlkPhos  190<H>  07-29    PT/INR - ( 28 Jul 2021 21:26 )   PT: 13.10 sec;   INR: 1.14 ratio         PTT - ( 28 Jul 2021 21:26 )  PTT:35.8 sec        Imaging:  No post-op imaging studies    Assessment:  71yM s/p laparoscopic cholecystectomy    Plan:  - carb consistent CLD, advance in AM  - Monitor vitals  - Monitor post-op labs and replete as necessary  - Monitor for bowel function  - Continue Pain Medications if necessary  - Continue Antibiotics if necessary  - Encourage ambulation as tolerated  - Monitor urine output and trial of void once  removed  - DVT and GI Prophylaxis  - Monitor wound and dressing for changes, redress as needed.      Date/Time: 07-29-21 @ 21:40 Post Operative Note  Patient: CHRISTINA MARIO 71y (1950) Male   MRN: [id number]  Location: [address]  Visit: 07-27-21 Inpatient  Date: 07-29-21 @ 21:40    Procedure: Acute cholecystitis     S/P Laparoscopic cholecystectomy        Subjective:   Nausea: no, Vomiting: no, Ambulating:  no, Flatus: no  Pain Assessment: Patient is complaining of MILD pain that is appropriate for post-operative course.     Objective:  Vitals: T(F): 97.5 (07-29-21 @ 20:00), Max: 98.7 (07-29-21 @ 05:00)  HR: 68 (07-29-21 @ 20:00)  BP: 136/69 (07-29-21 @ 20:00) (106/52 - 136/69)  RR: 17 (07-29-21 @ 20:00)  SpO2: 95% (07-29-21 @ 20:00)  Vent Settings:     In:   07-28-21 @ 07:01  -  07-29-21 @ 07:00  --------------------------------------------------------  IN: 1300 mL      IV Fluids: dextrose 5%. 1000 milliLiter(s) (50 mL/Hr) IV Continuous <Continuous>  dextrose 5%. 1000 milliLiter(s) (100 mL/Hr) IV Continuous <Continuous>  lactated ringers Bolus 1000 milliLiter(s) IV Bolus once  lactated ringers. 500 milliLiter(s) (50 mL/Hr) IV Continuous <Continuous>  lactated ringers. 1000 milliLiter(s) (75 mL/Hr) IV Continuous <Continuous>      Out:   07-28-21 @ 07:01  -  07-29-21 @ 07:00  --------------------------------------------------------  OUT: 0 mL      EBL:     Voided Urine:   07-28-21 @ 07:01  -  07-29-21 @ 07:00  --------------------------------------------------------  OUT: 0 mL       Catheter: no          Physical Examination:  General Appearance: NAD  HEENT: EOMI, sclera non-icteric.  Heart: RRR  Lungs: CTABL  Abdomen:  Soft, MILDLY tender, appropriate for post-op course, nondistended. No rigidity, guarding, or rebound tenderness.   MSK/Extremities: Warm & well-perfused. Peripheral pulses intact.  Skin: Warm, dry. No jaundice.   Incisions/Wounds: Dressings in place, clean, dry and intact, no signs of infection/active bleeding/drainage. incisions covered with dermabond skin glue    Medications: [Standing]  acetaminophen   Tablet .. 650 milliGRAM(s) Oral every 6 hours  amLODIPine   Tablet 10 milliGRAM(s) Oral daily  ampicillin/sulbactam  IVPB 3 Gram(s) IV Intermittent every 6 hours  aspirin enteric coated 81 milliGRAM(s) Oral daily  chlorhexidine 4% Liquid 1 Application(s) Topical daily  dextrose 40% Gel 15 Gram(s) Oral once  dextrose 5%. 1000 milliLiter(s) IV Continuous <Continuous>  dextrose 5%. 1000 milliLiter(s) IV Continuous <Continuous>  dextrose 50% Injectable 25 Gram(s) IV Push once  dextrose 50% Injectable 12.5 Gram(s) IV Push once  dextrose 50% Injectable 25 Gram(s) IV Push once  enoxaparin Injectable 40 milliGRAM(s) SubCutaneous daily  glucagon  Injectable 1 milliGRAM(s) IntraMuscular once  HYDROmorphone  Injectable 0.5 milliGRAM(s) IV Push every 10 minutes PRN  insulin glargine Injectable (LANTUS) 17 Unit(s) SubCutaneous at bedtime  insulin lispro (ADMELOG) corrective regimen sliding scale   SubCutaneous three times a day before meals  insulin lispro Injectable (ADMELOG) 6 Unit(s) SubCutaneous three times a day before meals  lactated ringers Bolus 1000 milliLiter(s) IV Bolus once  lactated ringers. 500 milliLiter(s) IV Continuous <Continuous>  lactated ringers. 1000 milliLiter(s) IV Continuous <Continuous>  ondansetron Injectable 4 milliGRAM(s) IV Push once PRN  pantoprazole    Tablet 40 milliGRAM(s) Oral before breakfast  simvastatin 20 milliGRAM(s) Oral at bedtime  tamsulosin 0.4 milliGRAM(s) Oral at bedtime  traMADol 25 milliGRAM(s) Oral every 6 hours PRN  valsartan 320 milliGRAM(s) Oral daily    Medications: [PRN]  acetaminophen   Tablet .. 650 milliGRAM(s) Oral every 6 hours  amLODIPine   Tablet 10 milliGRAM(s) Oral daily  ampicillin/sulbactam  IVPB 3 Gram(s) IV Intermittent every 6 hours  aspirin enteric coated 81 milliGRAM(s) Oral daily  chlorhexidine 4% Liquid 1 Application(s) Topical daily  dextrose 40% Gel 15 Gram(s) Oral once  dextrose 5%. 1000 milliLiter(s) IV Continuous <Continuous>  dextrose 5%. 1000 milliLiter(s) IV Continuous <Continuous>  dextrose 50% Injectable 25 Gram(s) IV Push once  dextrose 50% Injectable 12.5 Gram(s) IV Push once  dextrose 50% Injectable 25 Gram(s) IV Push once  enoxaparin Injectable 40 milliGRAM(s) SubCutaneous daily  glucagon  Injectable 1 milliGRAM(s) IntraMuscular once  HYDROmorphone  Injectable 0.5 milliGRAM(s) IV Push every 10 minutes PRN  insulin glargine Injectable (LANTUS) 17 Unit(s) SubCutaneous at bedtime  insulin lispro (ADMELOG) corrective regimen sliding scale   SubCutaneous three times a day before meals  insulin lispro Injectable (ADMELOG) 6 Unit(s) SubCutaneous three times a day before meals  lactated ringers Bolus 1000 milliLiter(s) IV Bolus once  lactated ringers. 500 milliLiter(s) IV Continuous <Continuous>  lactated ringers. 1000 milliLiter(s) IV Continuous <Continuous>  ondansetron Injectable 4 milliGRAM(s) IV Push once PRN  pantoprazole    Tablet 40 milliGRAM(s) Oral before breakfast  simvastatin 20 milliGRAM(s) Oral at bedtime  tamsulosin 0.4 milliGRAM(s) Oral at bedtime  traMADol 25 milliGRAM(s) Oral every 6 hours PRN  valsartan 320 milliGRAM(s) Oral daily      DVT PROPHYLAXIS: enoxaparin Injectable 40 milliGRAM(s) SubCutaneous daily    GI PROPHYLAXIS: pantoprazole    Tablet 40 milliGRAM(s) Oral before breakfast    ANTICOAGULATION:   ANTIBIOTICS:  ampicillin/sulbactam  IVPB 3 Gram(s)        Labs:                        11.0   8.99  )-----------( 130      ( 29 Jul 2021 20:26 )             33.7     07-29    130<L>  |  94<L>  |  20  ----------------------------<  191<H>  4.5   |  22  |  1.0    Ca    8.2<L>      29 Jul 2021 20:26  Phos  3.9     07-29  Mg     1.8     07-29    TPro  5.7<L>  /  Alb  3.2<L>  /  TBili  1.1  /  DBili  0.7<H>  /  AST  252<H>  /  ALT  108<H>  /  AlkPhos  190<H>  07-29    PT/INR - ( 28 Jul 2021 21:26 )   PT: 13.10 sec;   INR: 1.14 ratio         PTT - ( 28 Jul 2021 21:26 )  PTT:35.8 sec        Imaging:  No post-op imaging studies    Assessment:  71yM s/p laparoscopic cholecystectomy    Plan:  - carb consistent CLD, advance in AM can have Low fat diet  - Monitor vitals  - Monitor post-op labs and replete as necessary  - Monitor for bowel function  - Encourage ambulation as tolerated  - Monitor urine output  - DVT and GI Prophylaxis  - Monitor wound and dressing for changes, redress as needed.      Date/Time: 07-29-21 @ 21:40

## 2021-07-29 NOTE — CHART NOTE - NSCHARTNOTEFT_GEN_A_CORE
PACU ANESTHESIA ADMISSION NOTE      Procedure: Laparoscopic cholecystectomy      Post op diagnosis:  Acute cholecystitis        ____  Intubated  TV:______       Rate: ______      FiO2: ______    _x___  Patent Airway    __x__  Full return of protective reflexes    _x___  Full recovery from anesthesia / back to baseline     Vitals:   T: 98          R:   13               BP:  114/87                Sat:  99                 P: 70      Mental Status:  ___x_ Awake   __x___ Alert   _____ Drowsy   _____ Sedated    Nausea/Vomiting:  __x__ NO  ______Yes,   See Post - Op Orders          Pain Scale (0-10):  _____    Treatment: __x__ None    ____ See Post - Op/PCA Orders    Post - Operative Fluids:   ____ Oral   _x___ See Post - Op Orders    Plan: Discharge:   ____Home       __x___Floor     _____Critical Care    _____  Other:_________________    Comments:

## 2021-07-29 NOTE — PROGRESS NOTE ADULT - ATTENDING COMMENTS
Pt. seen and examined.  Agree with above.  No longer febrile.   MRCP reviewed, no choledocholithiasis.    Will proceed with laparoscopic cholecystectomy, possible open, possible intraoperative cholangiogram.    All questions answered.  Risks/benefits/alternatives discussed with patient including but not limited to:  benefits of surgery: treat gallbladder infection, prevent future attacks and possible future pancreatitis or choledocholithiasis  Risks of procedure:  bleeding, infection, damage to liver, bile ducts, intestine or other organs, retained stone, bile leak  Alternatives to procedure:  medical management (antibiotics) with high risk of recurrent infection or other complications (see above), percutaneous cholecystostomy tube (pt is medically optimized for surgery and general anesthesia and this is normally reserved for patients who are too high risk for gallbladder removal because it does not treat the underlying problem of gallstones).    Patient has been seen by cardiology; echo has been completed.

## 2021-07-29 NOTE — BRIEF OPERATIVE NOTE - OPERATION/FINDINGS
Edematous, severely inflamed, gangrenous gallbladder with thickened rind, bile, and pus, critical view obtained, cystic artery and duct clipped

## 2021-07-29 NOTE — CONSULT NOTE ADULT - ASSESSMENT
ASSESSMENT:  71yM w/ PMHx of RNYGB in 2012 with 110 lb weight loss, HTN, HLD presenting with 1 day history of RUQ abdominal pain likely secondary to biliary colic however in the ED found to be febriile to Tm 102    Plan:  - HIDA scan  - NPO, IVF, Unasyn  - daily LFTs, CBC  - trend WBC and fever curve  - recommend UA/UCx/BCx  - surgery team to follow  - Cardiology consult for preoperative risk stratification for possible laparoscopic cholecystectomy this admission (Dr. Banerjee)    Above plan discussed with Attending Surgeon Dr. Willoughby  , patient, patient family, and Primary team  07-27-21 @ 16:24  
IMPRESSION:  Abdominal pain, likely secondary to cholecystitis  H/O hypertension, dyslipidemia, diabetes mellitus, COPD, GERD, diverticulosis, OA, DDD, DJD, BPH morbid obesity s/p gastric bypass, daily EtOH abuse    SUMMARY:    Patient-based characteristics (Functional capacity)  Patient is able to achieve more than 4 MET (walk 4 blocks, climb 2 flights of stairs, etc...) Y [X] / N []    High-risk patient features:  - Recent (<30 days) or active MI				Y [] / N [X]  - Unstable or severe angina				Y [] / N [X]  - Decompensated, worsening, or new-onset heart failure	Y [] / N [X]  - Severe valvular disease				Y [] / N [X]  - Significant arrhythmia (Tachy- or Bradyarrhythmia)	Y [] / N [X]    Surgery/Procedure-based characteristics (Type of surgery)  - Low-risk procedure (outpatient procedure, elective, endoscopy, etc...)		Y [] / N []  - Elevated or Moderate-risk procedure (Inpatient)				Y [X] / N []  - High-risk procedure (urgent/emergent procedure, Intrathoracic, vascular, etc...)	Y [] / N []    Revised Cardiac Risk Index (RCRI)  1- History of ischemic heart disease		Y [] / N [X]  2- History of congestive heart failure		Y [] / N [X]  3- History of stroke/TIA				Y [] / N [X]  4- History of insulin-dependent diabetes		Y [] / N [X]  5- Chronic kidney disease (Cr >2mg/dL)		Y [] / N [X]  6- Undergoing suprainguinal vascular, intraperitoneal, or intrathoracic surgery	Y [X] / N []    Class I risk (0 factors) --> 3.9% (30-day risk of death, MI, or cardiac arrest)  Class II risk (1 factor) --> 6% risk (30-day risk of death, MI, or cardiac arrest)  Class III risk (2 factors) --> 10.1% risk (30-day risk of death, MI, or cardiac arrest)  Class IV risk (3 or more factors) --> >15% risk (30-day risk of death, MI, or cardiac arrest)    IMPRESSION & RECOMMENDATIONS:  Moderate-risk patient for a Moderate-risk surgery/procedure  No further cardiac work-up is needed at the moment. There are no current cardiac contraindications to prevent from proceeding with the scheduled surgery/procedure.    This consult serves only as a thang-operative cardiac risk stratification and evaluation to predict 30-days cardiac complications risk and mortality. The decision to proceed with the surgery/procedure is made by the performing physician and the patient    *THIS IS AN INCOMPLETE NOTE. PENDING EVALUATION BY ATTENDING*
 70yo  Male came to the ER for c/o abd pain with episode of N and V after eating a fatty meal.  The pt states pain is severe 8/10 located in the epigastrium and RUQ, pressure like.  The pt has  + cholelithiasis on the U of abd with out marcelo abd wall thick or pericholecystic fluid.  The The pt had T of 102.  Pt states he drinks ETOH daily. The pt is being ad for  further w/up and management of pain and T.  The PMHx includes: HTMN, ASHD, DLD, DM II, COPD, MO, BMI 32, sp gastric bypass procedure, GERD, cholelithiasis, OA, DDD, DJD, BPH.    IMPRESSION;  Resolved sepsis secondary to acute cholecystitis  Presently has no abdominal pain  Clinically no cholangitis  7/27 HIDA : non visualization of the GB  7/27 : CT A/P : GB stones but no CBD dilatation or GB wall thick or pericholecystic fluid.     RECOMMENDATIONS;  Cholecystectomy planned for today.  Unasyn 3 gm iv q6h  Could change to po Augmentin 875 mg q12h post op till 8/5  recall prn please

## 2021-07-29 NOTE — MEDICAL STUDENT PROGRESS NOTE(EDUCATION) - NS MD HP STUD ASPLAN PLAN FT
71y M w/ pmhx of  DM, HTN and Gastric bypass (2012) presented with acute onset epigastric pain that has now resolved however patient still spiking fevers with a Tmax 100.8.    # Abdominal pain 2/2 to Cholecystitis  - Acute onset epigastric pain after consumption of fatty meals  - Febrile in , WBC 11k, hypotensive, tachycardic: septic on admission. 1 set of blood cultures ordered and received  -  LFTs within normal limit, lipase 91  - US abdomen Cholelithiasis without definite evidence of cholecystitis.   - No CT evidence of acute intra-abdominal infectious/inflammatory process  - Surgery consulted recommended HIDA scan and started patient on abx (Unasyn).   - Also recommended NPO, IVF, daily LFTs, CBC, trend WBC and temp for fever spikes  - HIDA scan with no visualization of the gallbladder consistent with cholecystitis.   - WBC 11.6 (7/28/2021), fever 102 in AM, pt given Tylenol    - Cardiology consult for preoperative risk stratification for possible laparoscopic cholecystectomy this admission  - Moderate-risk patient for a Moderate-risk surgery/procedure. No further cardiac work-up. No current cardiac contraindications to prevent from proceeding with the scheduled surgery/procedure.  - MRCP performed. Pending results  - Scheduled for laparoscopic cholecystectomy tomorrow.   - Pre-op labs ordered (CBC, CMP, Mg, T&S, PTT, PT & INR, Hep function panel)  - EKG normal sinus rhythm, CXR 7/26 normal findings  - NPO, Tylenol PRN, IV fluids      # DM  - BG this morning 117   - Lantus 17 U at bedtime  - lispro 6 q 8 and Sliding scale insulin   - on Januvia 25 mg daily at home well controlled      # Hypertension  - Blood pressures 106/55   - Hold bp meds; Valsartan 320 mg daily Amlodipine 10 mg daily    Misc:  # DVT ppx: heparin subQ  # GI ppx: Protonix  # Diet: clear liquids, NPO  # Activity: As tolerated  # Code: full code  # Dispo: Laparoscopic Cholecystectomy tomorrow, acute    #Handoff  - F/u 8pm pre-op labs (CBC, CMP, coags, Hep function, T&S)  - F/U MRCP results 71y M w/ pmhx of  DM, HTN and Gastric bypass (2012) presented with acute onset epigastric pain secondary to acute cholecystitis. Patient scheduled for laparoscopic cholecystectomy on 2/29/2021.     # Abdominal pain 2/2 to Cholecystitis  - Acute onset epigastric pain after consumption of fatty meals  - Febrile in , WBC 11k, hypotensive, tachycardic: septic on admission. 1 set of blood cultures ordered and received  - LFTs within normal limit, lipase 91  - US abdomen Cholelithiasis without definite evidence of cholecystitis.   - No CT evidence of acute intra-abdominal infectious/inflammatory process  - Surgery consulted recommended HIDA scan and started patient on abx (Unasyn).   - Also recommended NPO, IVF, daily LFTs, CBC, trend WBC and temp for fever spikes  - HIDA scan with no visualization of the gallbladder consistent with cholecystitis.   - MRCP significant for distended gallbladder with surrounding inflammation consistent with acute cholecystitis   - WBC 9.9  (7/28/2021)  - Last fever 7/28/2021 100.8 in PM, pt given Tylenol    - Cardiology consult for preoperative risk stratification for possible laparoscopic cholecystectomy this admission  - Moderate-risk patient for a Moderate-risk surgery/procedure. No further cardiac work-up. No current cardiac contraindications to prevent from proceeding with the scheduled surgery/procedure.  - Scheduled for laparoscopic cholecystectomy 2/29  - ID recommends continuing Unasyn 3 gm iv q6h and  possible change to po Augmentin 875 mg q12h post op till 8/5       # DM  - BG this morning 132  - A1C: 8.4   - Lantus 17 U at bedtime  - lispro 6 q 8 and Sliding scale insulin   - on Januvia 25 mg daily at home       # Hypertension  - Blood pressures 106/55   - Hold bp meds; Valsartan 320 mg daily Amlodipine 10 mg daily    Misc:  # DVT ppx: heparin subQ  # GI ppx: Protonix  # Diet: clear liquids, NPO  # Activity: As tolerated  # Code: full code  # Dispo: Laparoscopic Cholecystectomy today     #Handoff  - F/u post-op  71y M w/ pmhx of  DM, HTN and Gastric bypass (2012) presented with acute onset epigastric pain secondary to acute cholecystitis. Patient scheduled for laparoscopic cholecystectomy on 2/29/2021.     # Abdominal pain 2/2 to Cholecystitis  - Acute onset epigastric pain after consumption of fatty meals  - Febrile in , WBC 11k, hypotensive, tachycardic: septic on admission. 1 set of blood cultures sent. No growth to date.  - LFTs within normal limit, lipase 91  - US abdomen Cholelithiasis without definite evidence of cholecystitis.   - No CT evidence of acute intra-abdominal infectious/inflammatory process  - Surgery consulted recommended HIDA scan and started patient on abx (Unasyn).   - Also recommended NPO, IVF, daily LFTs, CBC, trend WBC and temp for fever spikes  - HIDA scan with no visualization of the gallbladder consistent with cholecystitis.   - MRCP significant for distended gallbladder with surrounding inflammation consistent with acute cholecystitis   - WBC 9.9  (7/28/2021)  - Last fever 7/28/2021 100.8 in PM, pt given Tylenol    - Cardiology consult for preoperative risk stratification for possible laparoscopic cholecystectomy this admission  - Moderate-risk patient for a Moderate-risk surgery/procedure. No further cardiac work-up. No current cardiac contraindications to prevent from proceeding with the scheduled surgery/procedure.  - laparoscopic cholecystectomy 7/29  - ID recommends continuing Unasyn 3 gm iv q6h and  possible change to po Augmentin 875 mg q12h post op till 8/5       # DM  - BG this morning 132  - A1C: 8.4   - Lantus 17 U at bedtime  - lispro 6 q 8 and Sliding scale insulin   - on Januvia 25 mg daily at home       # Hypertension  - Blood pressures 106/55   - Hold bp meds; Valsartan 320 mg daily Amlodipine 10 mg daily    Misc:  # DVT ppx: heparin subQ  # GI ppx: Protonix  # Diet: clear liquids, NPO  # Activity: As tolerated  # Code: full code  # Dispo: Laparoscopic Cholecystectomy today     #Handoff  - F/u post-op

## 2021-07-29 NOTE — CONSULT NOTE ADULT - SUBJECTIVE AND OBJECTIVE BOX
CHRISTINA MARIO  71y, Male  Allergy: sulfa drugs (Hives; Urticaria)      All historical available data reviewed.    HPI:  70 yo male , H/o DM II, HTN BPH and gastric Bypass presented for abdominal pain  Pain woke up yesterday with Nausea and one episode of vomiting. He had fatty breakfast and lunch  at 4PM he started to have RUQ and epigastric pain rate 8/10, non radiating, constant, pressure like, associated with anorexia.  No fever, chills, headache, diarrhea or constipation  No chest pain, palpitations or SOB  No similar episodes in the past  Pain was barely relieved by ASA and Nitroglycerin given by EMS  Patient reports drinking alcohol on daily basis 2 cups/day  ED vitals remarkable for hypertension Sbp 180 mmhg (2021 04:02)    FAMILY HISTORY:    PAST MEDICAL & SURGICAL HISTORY:  GERD (gastroesophageal reflux disease)    HTN (hypertension)    HLD (hyperlipidemia)    BPH (benign prostatic hyperplasia)    S/P gastric bypass          VITALS:  T(F): 98.1, Max: 100.8 (- @ 14:40)  HR: 72  BP: 109/54  RR: 18Vital Signs Last 24 Hrs  T(C): 36.7 (2021 05:09), Max: 38.2 (2021 14:40)  T(F): 98.1 (2021 05:09), Max: 100.8 (2021 14:40)  HR: 72 (2021 05:09) (72 - 95)  BP: 109/54 (2021 05:09) (109/54 - 138/72)  BP(mean): --  RR: 18 (2021 05:09) (18 - 18)  SpO2: --    TESTS & MEASUREMENTS:                        10.7   9.90  )-----------( 132      ( 2021 21:26 )             32.4     -    135  |  98  |  17  ----------------------------<  131<H>  3.9   |  28  |  1.1    Ca    8.5      2021 21:26  Phos  3.5     -  Mg     2.0     -    TPro  5.9<L>  /  Alb  3.5  /  TBili  1.1  /  DBili  0.3<H>  /  AST  19  /  ALT  22  /  AlkPhos  65      LIVER FUNCTIONS - ( 2021 21:26 )  Alb: 3.5 g/dL / Pro: 5.9 g/dL / ALK PHOS: 65 U/L / ALT: 22 U/L / AST: 19 U/L / GGT: x             Culture - Blood (collected 21 @ 22:06)  Source: .Blood None  Preliminary Report (21 @ 07:01):    No growth to date.      Urinalysis Basic - ( 2021 11:15 )    Color: Yellow / Appearance: Clear / S.028 / pH: x  Gluc: x / Ketone: Trace  / Bili: Negative / Urobili: <2 mg/dL   Blood: x / Protein: 100 mg/dL / Nitrite: Negative   Leuk Esterase: Negative / RBC: 4 /HPF / WBC 1 /HPF   Sq Epi: x / Non Sq Epi: 0 /HPF / Bacteria: Negative          RADIOLOGY & ADDITIONAL TESTS:  Personal review of radiological diagnostics performed  Echo and EKG results noted when applicable.     MEDICATIONS:  acetaminophen   Tablet .. 650 milliGRAM(s) Oral every 6 hours PRN  amLODIPine   Tablet 10 milliGRAM(s) Oral daily  ampicillin/sulbactam  IVPB      ampicillin/sulbactam  IVPB 3 Gram(s) IV Intermittent every 6 hours  aspirin enteric coated 81 milliGRAM(s) Oral daily  chlorhexidine 4% Liquid 1 Application(s) Topical daily  dextrose 40% Gel 15 Gram(s) Oral once  dextrose 5%. 1000 milliLiter(s) IV Continuous <Continuous>  dextrose 5%. 1000 milliLiter(s) IV Continuous <Continuous>  dextrose 50% Injectable 25 Gram(s) IV Push once  dextrose 50% Injectable 12.5 Gram(s) IV Push once  dextrose 50% Injectable 25 Gram(s) IV Push once  enoxaparin Injectable 40 milliGRAM(s) SubCutaneous daily  glucagon  Injectable 1 milliGRAM(s) IntraMuscular once  insulin glargine Injectable (LANTUS) 17 Unit(s) SubCutaneous at bedtime  insulin lispro (ADMELOG) corrective regimen sliding scale   SubCutaneous three times a day before meals  insulin lispro Injectable (ADMELOG) 6 Unit(s) SubCutaneous three times a day before meals  lactated ringers. 500 milliLiter(s) IV Continuous <Continuous>  pantoprazole    Tablet 40 milliGRAM(s) Oral before breakfast  simvastatin 20 milliGRAM(s) Oral at bedtime  tamsulosin 0.4 milliGRAM(s) Oral at bedtime  traMADol 25 milliGRAM(s) Oral every 6 hours PRN  valsartan 320 milliGRAM(s) Oral daily      ANTIBIOTICS:  ampicillin/sulbactam  IVPB      ampicillin/sulbactam  IVPB 3 Gram(s) IV Intermittent every 6 hours

## 2021-07-29 NOTE — MEDICAL STUDENT PROGRESS NOTE(EDUCATION) - SUBJECTIVE AND OBJECTIVE BOX
CHRISTINA MARIO 71y Male  MRN#: [id number]      Pt is currently admitted with the primary diagnosis of Cholecystitis     SUBJECTIVE  Hospital Day: 2d  HPI:   Pt is a 70 yo male  with a pmhx of DM, HTN, BPH and Gastric Bypass () who presented to the ED with sudden onset abdominal pain. Pt woke up with nausea and one episode of non bloody non bilious vomiting. Around 4pm pt reports RUQ and epigastric pain rate 8/10, non radiating, constant, pressure like, associated with anorexia. He did not have any chills, headache, diarrhea or constipation. No chest pain, palpitations or SOB. He did not have any similar episodes in the past. Patient reports drinking 2-3 shots of [place] on a daily basis. Pts last drink was night before presenting to the ED.      Hospital Course:  In the ED patient was found to be febrile with a temp of 102. Abdominal workup showed cholelithiasis on abdominal US. Surgery was consulted they Recommended HIDA scan for possible cholecystitis. HIDA scan result significant for no visualization of gallbladder after 4 hrs indicating cholecystitis. MRCP significant for distended gallbladder with surrounding inflammatory changes, compatible with known acute cholecystitis. Pt seen by cardiology and cleared for surgery.     Overnight events:  No acute overnight events.     Subjective complaints:  Pt sitting comfortably in bed. No longer complaining of abdominal pain.     Present Today:   - :  No [ x ], Yes [   ] : Indication:     - Type of IV Access:       .. CVC/Piccline:  No [ x], Yes [   ] : Indication:       .. Midline: No [x ], Yes [   ] : Indication:                                             ----------------------------------------------------------  OBJECTIVE    Patient continues to spike fevers, last fever  5pm Tmax 100.8.       PAST MEDICAL & SURGICAL HISTORY  GERD (gastroesophageal reflux disease)    HTN (hypertension)    HLD (hyperlipidemia)    BPH (benign prostatic hyperplasia)    S/P gastric bypass                                              -----------------------------------------------------------  ALLERGIES:  sulfa drugs (Hives; Urticaria)                                            ------------------------------------------------------------    HOME MEDICATIONS  Home Medications:  aspirin 81 mg oral tablet: 1 tab(s) orally once a day (2021 03:59)  Januvia 25 mg oral tablet: 1 tab(s) orally once a day (2021 03:59)  simvastatin 20 mg oral tablet: 1 tab(s) orally once a day (at bedtime) (2021 03:59)  tamsulosin 0.4 mg oral capsule: 1 cap(s) orally once a day (2021 03:59)  valsartan-hydrochlorothiazide 320mg-25mg oral tablet: 1 tab(s) orally once a day (2021 03:59)                           MEDICATIONS:  STANDING MEDICATIONS  amLODIPine   Tablet 10 milliGRAM(s) Oral daily  ampicillin/sulbactam  IVPB      ampicillin/sulbactam  IVPB 3 Gram(s) IV Intermittent every 6 hours  aspirin enteric coated 81 milliGRAM(s) Oral daily  chlorhexidine 4% Liquid 1 Application(s) Topical daily  dextrose 40% Gel 15 Gram(s) Oral once  dextrose 5%. 1000 milliLiter(s) IV Continuous <Continuous>  dextrose 5%. 1000 milliLiter(s) IV Continuous <Continuous>  dextrose 50% Injectable 25 Gram(s) IV Push once  dextrose 50% Injectable 12.5 Gram(s) IV Push once  dextrose 50% Injectable 25 Gram(s) IV Push once  enoxaparin Injectable 40 milliGRAM(s) SubCutaneous daily  glucagon  Injectable 1 milliGRAM(s) IntraMuscular once  insulin glargine Injectable (LANTUS) 17 Unit(s) SubCutaneous at bedtime  insulin lispro (ADMELOG) corrective regimen sliding scale   SubCutaneous three times a day before meals  insulin lispro Injectable (ADMELOG) 6 Unit(s) SubCutaneous three times a day before meals  lactated ringers. 500 milliLiter(s) IV Continuous <Continuous>  pantoprazole    Tablet 40 milliGRAM(s) Oral before breakfast  simvastatin 20 milliGRAM(s) Oral at bedtime  tamsulosin 0.4 milliGRAM(s) Oral at bedtime  valsartan 320 milliGRAM(s) Oral daily    PRN MEDICATIONS  acetaminophen   Tablet .. 650 milliGRAM(s) Oral every 6 hours PRN  traMADol 25 milliGRAM(s) Oral every 6 hours PRN                                            ------------------------------------------------------------  VITAL SIGNS: Last 24 Hours  T(C): 36.7 (2021 05:09), Max: 38.2 (2021 14:40)  T(F): 98.1 (2021 05:09), Max: 100.8 (2021 14:40)  HR: 72 (2021 05:09) (72 - 95)  BP: 109/54 (2021 05:09) (109/54 - 138/72)  BP(mean): --  RR: 18 (2021 05:09) (18 - 18)  SpO2: --      21 @ 07:01  -  21 @ 07:00  --------------------------------------------------------  IN: 1300 mL / OUT: 0 mL / NET: 1300 mL                                             --------------------------------------------------------------  LABS:                        10.7   9.90  )-----------( 132      ( 2021 21:26 )             32.4         135  |  98  |  17  ----------------------------<  131<H>  3.9   |  28  |  1.1    Ca    8.5      2021 21:26  Phos  3.5       Mg     2.0         TPro  5.9<L>  /  Alb  3.5  /  TBili  1.1  /  DBili  0.3<H>  /  AST  19  /  ALT  22  /  AlkPhos  65      PT/INR - ( 2021 21:26 )   PT: 13.10 sec;   INR: 1.14 ratio         PTT - ( 2021 21:26 )  PTT:35.8 sec  Urinalysis Basic - ( 2021 11:15 )    Color: Yellow / Appearance: Clear / S.028 / pH: x  Gluc: x / Ketone: Trace  / Bili: Negative / Urobili: <2 mg/dL   Blood: x / Protein: 100 mg/dL / Nitrite: Negative   Leuk Esterase: Negative / RBC: 4 /HPF / WBC 1 /HPF   Sq Epi: x / Non Sq Epi: 0 /HPF / Bacteria: Negative              Culture - Blood (collected 2021 22:06)  Source: .Blood None  Preliminary Report (2021 07:01):    No growth to date.                                                    -------------------------------------------------------------  RADIOLOGY:                                            --------------------------------------------------------------    PHYSICAL EXAM:  General:   HEENT:  LUNGS:  HEART:  ABDOMEN:  EXT:  NEURO:  SKIN:                             CHRISTINA MARIO 71y Male  MRN#: [id number]      Pt is currently admitted with the primary diagnosis of Cholecystitis     SUBJECTIVE  Hospital Day: 2d  HPI:   Pt is a 70 yo male  with a pmhx of DM, HTN, BPH and Gastric Bypass () who presented to the ED with sudden onset abdominal pain. Pt woke up with nausea and one episode of non bloody non bilious vomiting. Around 4pm pt reports RUQ and epigastric pain rate 8/10, non radiating, constant, pressure like, associated with anorexia. He did not have any chills, headache, diarrhea or constipation. No chest pain, palpitations or SOB. He did not have any similar episodes in the past. Patient reports drinking 2-3 shots of [place] on a daily basis. Pts last drink was night before presenting to the ED.      Hospital Course:  In the ED patient was found to be febrile with a temp of 102. Abdominal workup showed cholelithiasis on abdominal US. Surgery was consulted they Recommended HIDA scan for possible cholecystitis. HIDA scan result significant for no visualization of gallbladder after 4 hrs indicating cholecystitis. MRCP significant for distended gallbladder with surrounding inflammatory changes, compatible with known acute cholecystitis. Pt seen by cardiology and cleared for surgery.     Overnight events:  No acute overnight events.     Subjective complaints:  Pt sitting comfortably in bed. No longer complaining of abdominal pain.     Present Today:   - :  No [ x ], Yes [   ] : Indication:     - Type of IV Access:       .. CVC/Piccline:  No [ x], Yes [   ] : Indication:       .. Midline: No [x ], Yes [   ] : Indication:                                             ----------------------------------------------------------  OBJECTIVE    Patient continues to spike fevers, last fever  5pm Tmax 100.8.       PAST MEDICAL & SURGICAL HISTORY  GERD (gastroesophageal reflux disease)    HTN (hypertension)    HLD (hyperlipidemia)    BPH (benign prostatic hyperplasia)    S/P gastric bypass                                              -----------------------------------------------------------  ALLERGIES:  sulfa drugs (Hives; Urticaria)                                            ------------------------------------------------------------    HOME MEDICATIONS  Home Medications:  aspirin 81 mg oral tablet: 1 tab(s) orally once a day (2021 03:59)  Januvia 25 mg oral tablet: 1 tab(s) orally once a day (2021 03:59)  simvastatin 20 mg oral tablet: 1 tab(s) orally once a day (at bedtime) (2021 03:59)  tamsulosin 0.4 mg oral capsule: 1 cap(s) orally once a day (2021 03:59)  valsartan-hydrochlorothiazide 320mg-25mg oral tablet: 1 tab(s) orally once a day (2021 03:59)                           MEDICATIONS:  STANDING MEDICATIONS  amLODIPine   Tablet 10 milliGRAM(s) Oral daily  ampicillin/sulbactam  IVPB      ampicillin/sulbactam  IVPB 3 Gram(s) IV Intermittent every 6 hours  aspirin enteric coated 81 milliGRAM(s) Oral daily  chlorhexidine 4% Liquid 1 Application(s) Topical daily  dextrose 40% Gel 15 Gram(s) Oral once  dextrose 5%. 1000 milliLiter(s) IV Continuous <Continuous>  dextrose 5%. 1000 milliLiter(s) IV Continuous <Continuous>  dextrose 50% Injectable 25 Gram(s) IV Push once  dextrose 50% Injectable 12.5 Gram(s) IV Push once  dextrose 50% Injectable 25 Gram(s) IV Push once  enoxaparin Injectable 40 milliGRAM(s) SubCutaneous daily  glucagon  Injectable 1 milliGRAM(s) IntraMuscular once  insulin glargine Injectable (LANTUS) 17 Unit(s) SubCutaneous at bedtime  insulin lispro (ADMELOG) corrective regimen sliding scale   SubCutaneous three times a day before meals  insulin lispro Injectable (ADMELOG) 6 Unit(s) SubCutaneous three times a day before meals  lactated ringers. 500 milliLiter(s) IV Continuous <Continuous>  pantoprazole    Tablet 40 milliGRAM(s) Oral before breakfast  simvastatin 20 milliGRAM(s) Oral at bedtime  tamsulosin 0.4 milliGRAM(s) Oral at bedtime  valsartan 320 milliGRAM(s) Oral daily    PRN MEDICATIONS  acetaminophen   Tablet .. 650 milliGRAM(s) Oral every 6 hours PRN  traMADol 25 milliGRAM(s) Oral every 6 hours PRN                                            ------------------------------------------------------------  VITAL SIGNS: Last 24 Hours  T(C): 36.7 (2021 05:09), Max: 38.2 (2021 14:40)  T(F): 98.1 (2021 05:09), Max: 100.8 (2021 14:40)  HR: 72 (2021 05:09) (72 - 95)  BP: 109/54 (2021 05:09) (109/54 - 138/72)  BP(mean): --  RR: 18 (2021 05:09) (18 - 18)  SpO2: --      21 @ 07:01  -  21 @ 07:00  --------------------------------------------------------  IN: 1300 mL / OUT: 0 mL / NET: 1300 mL                                             --------------------------------------------------------------  LABS:                        10.7   9.90  )-----------( 132      ( 2021 21:26 )             32.4         135  |  98  |  17  ----------------------------<  131<H>  3.9   |  28  |  1.1    Ca    8.5      2021 21:26  Phos  3.5       Mg     2.0         TPro  5.9<L>  /  Alb  3.5  /  TBili  1.1  /  DBili  0.3<H>  /  AST  19  /  ALT  22  /  AlkPhos  65      PT/INR - ( 2021 21:26 )   PT: 13.10 sec;   INR: 1.14 ratio         PTT - ( 2021 21:26 )  PTT:35.8 sec  Urinalysis Basic - ( 2021 11:15 )    Color: Yellow / Appearance: Clear / S.028 / pH: x  Gluc: x / Ketone: Trace  / Bili: Negative / Urobili: <2 mg/dL   Blood: x / Protein: 100 mg/dL / Nitrite: Negative   Leuk Esterase: Negative / RBC: 4 /HPF / WBC 1 /HPF   Sq Epi: x / Non Sq Epi: 0 /HPF / Bacteria: Negative        Culture - Blood (collected 2021 22:06)  Source: .Blood None  Preliminary Report (2021 07:01):    No growth to date.                                                    -------------------------------------------------------------  RADIOLOGY:    US Abdomen   < from: US Abdomen Upper Quadrant Right (21 @ 01:43) >  IMPRESSION:    Cholelithiasis without definite evidence of cholecystitis. If clinically warranted, nuclear medicine HIDA scan may be of benefit.    < end of copied text >    HIDA Scan   < from: NM Hepatobiliary Imaging (21 @ 21:55) >  IMPRESSION:    NO DEFINITE VISUALIZATION OF THE GALLBLADDER THROUGH 4 HOURS POST-INJECTION, CONSISTENT WITH CHOLECYSTITIS, AS DESCRIBED ABOVE.  CLINICALCORRELATION IS SUGGESTED.    < end of copied text >    MRCP   < from: MR MRCP No Cont (21 @ 16:53) >  IMPRESSION:  1.  Distended gallbladder with surrounding inflammatory changes, compatible with known acute cholecystitis.  2.  No intra-/extrahepatic biliary ductal dilation or evidence for choledocholithiasis.    < end of copied text >                                            --------------------------------------------------------------    PHYSICAL EXAM:    General: NAD, well-appearing  Cardiac: RRR S1, S2, no Murmurs, rubs or gallops  Lungs: Clear to auscultation bilaterally, no wheeze, rhonchi or crackles  Abdomen: Soft, non-distended, non-tender, no rebound, no guarding, bowel sounds present. Negative Morrison's sign. Negative Rovsing   Neuro: nonfocal, AAOx3   Musculoskeletal: moves all extremities  Skin: No rashes or jaundice.                                CHRISTINA MARIO 71y Male  MRN#: [id number]      Pt is currently admitted with the primary diagnosis of Cholecystitis     SUBJECTIVE  Hospital Day: 2d    HPI: Pt is a 72 yo male  with a pmhx of DM, HTN, BPH and Gastric Bypass () who presented to the ED with sudden onset abdominal pain. Pt woke up with nausea and one episode of non bloody non bilious vomiting. Around 4pm pt reports RUQ and epigastric pain rate 8/10, non radiating, constant, pressure like, associated with anorexia. He did not have any chills, headache, diarrhea or constipation. No chest pain, palpitations or SOB. He did not have any similar episodes in the past. Patient reports drinking 2-3 shots of [place] on a daily basis. Pts last drink was night before presenting to the ED.      Hospital Course:  In the ED patient was found to be febrile with a temp of 102. Abdominal workup showed cholelithiasis on abdominal US. Surgery was consulted, they recommended HIDA scan for possible cholecystitis. HIDA scan result significant for no visualization of gallbladder after 4 hrs indicating cholecystitis. MRCP significant for distended gallbladder with surrounding inflammatory changes, compatible with known acute cholecystitis. Pt seen by cardiology and cleared for surgery. Surgery planned for 2021    Overnight events:  No acute overnight events.     Subjective complaints:  Pt sitting comfortably in bed. No longer complaining of abdominal pain.     Present Today:   - :  No [ x ], Yes [   ] : Indication:     - Type of IV Access:       .. CVC/Piccline:  No [ x], Yes [   ] : Indication:       .. Midline: No [x ], Yes [   ] : Indication:                                             ----------------------------------------------------------  OBJECTIVE    Patient continues to spike fevers, last fever  5pm Tmax 100.8.      PAST MEDICAL & SURGICAL HISTORY  GERD (gastroesophageal reflux disease)    HTN (hypertension)    HLD (hyperlipidemia)    BPH (benign prostatic hyperplasia)    S/P gastric bypass                                              -----------------------------------------------------------  ALLERGIES:  sulfa drugs (Hives; Urticaria)                                            ------------------------------------------------------------    HOME MEDICATIONS  Home Medications:  aspirin 81 mg oral tablet: 1 tab(s) orally once a day (2021 03:59)  Januvia 25 mg oral tablet: 1 tab(s) orally once a day (2021 03:59)  simvastatin 20 mg oral tablet: 1 tab(s) orally once a day (at bedtime) (2021 03:59)  tamsulosin 0.4 mg oral capsule: 1 cap(s) orally once a day (2021 03:59)  valsartan-hydrochlorothiazide 320mg-25mg oral tablet: 1 tab(s) orally once a day (2021 03:59)                           MEDICATIONS:  STANDING MEDICATIONS  amLODIPine   Tablet 10 milliGRAM(s) Oral daily  ampicillin/sulbactam  IVPB      ampicillin/sulbactam  IVPB 3 Gram(s) IV Intermittent every 6 hours  aspirin enteric coated 81 milliGRAM(s) Oral daily  chlorhexidine 4% Liquid 1 Application(s) Topical daily  dextrose 40% Gel 15 Gram(s) Oral once  dextrose 5%. 1000 milliLiter(s) IV Continuous <Continuous>  dextrose 5%. 1000 milliLiter(s) IV Continuous <Continuous>  dextrose 50% Injectable 25 Gram(s) IV Push once  dextrose 50% Injectable 12.5 Gram(s) IV Push once  dextrose 50% Injectable 25 Gram(s) IV Push once  enoxaparin Injectable 40 milliGRAM(s) SubCutaneous daily  glucagon  Injectable 1 milliGRAM(s) IntraMuscular once  insulin glargine Injectable (LANTUS) 17 Unit(s) SubCutaneous at bedtime  insulin lispro (ADMELOG) corrective regimen sliding scale   SubCutaneous three times a day before meals  insulin lispro Injectable (ADMELOG) 6 Unit(s) SubCutaneous three times a day before meals  lactated ringers. 500 milliLiter(s) IV Continuous <Continuous>  pantoprazole    Tablet 40 milliGRAM(s) Oral before breakfast  simvastatin 20 milliGRAM(s) Oral at bedtime  tamsulosin 0.4 milliGRAM(s) Oral at bedtime  valsartan 320 milliGRAM(s) Oral daily    PRN MEDICATIONS  acetaminophen   Tablet .. 650 milliGRAM(s) Oral every 6 hours PRN  traMADol 25 milliGRAM(s) Oral every 6 hours PRN                                            ------------------------------------------------------------  VITAL SIGNS: Last 24 Hours  T(C): 36.7 (2021 05:09), Max: 38.2 (2021 14:40)  T(F): 98.1 (2021 05:09), Max: 100.8 (2021 14:40)  HR: 72 (2021 05:09) (72 - 95)  BP: 109/54 (2021 05:09) (109/54 - 138/72)  BP(mean): --  RR: 18 (2021 05:09) (18 - 18)  SpO2: --      21 @ 07:01  -  21 @ 07:00  --------------------------------------------------------  IN: 1300 mL / OUT: 0 mL / NET: 1300 mL                                             --------------------------------------------------------------  LABS:                        10.7   9.90  )-----------( 132      ( 2021 21:26 )             32.4         135  |  98  |  17  ----------------------------<  131<H>  3.9   |  28  |  1.1    Ca    8.5      2021 21:26  Phos  3.5       Mg     2.0         TPro  5.9<L>  /  Alb  3.5  /  TBili  1.1  /  DBili  0.3<H>  /  AST  19  /  ALT  22  /  AlkPhos  65  -    PT/INR - ( 2021 21:26 )   PT: 13.10 sec;   INR: 1.14 ratio         PTT - ( 2021 21:26 )  PTT:35.8 sec  Urinalysis Basic - ( 2021 11:15 )    Color: Yellow / Appearance: Clear / S.028 / pH: x  Gluc: x / Ketone: Trace  / Bili: Negative / Urobili: <2 mg/dL   Blood: x / Protein: 100 mg/dL / Nitrite: Negative   Leuk Esterase: Negative / RBC: 4 /HPF / WBC 1 /HPF   Sq Epi: x / Non Sq Epi: 0 /HPF / Bacteria: Negative        Culture - Blood (collected 2021 22:06)  Source: .Blood None  Preliminary Report (2021 07:01):    No growth to date.                                                    -------------------------------------------------------------  RADIOLOGY:    US Abdomen   < from: US Abdomen Upper Quadrant Right (21 @ 01:43) >  IMPRESSION:    Cholelithiasis without definite evidence of cholecystitis. If clinically warranted, nuclear medicine HIDA scan may be of benefit.    < end of copied text >    HIDA Scan   < from: NM Hepatobiliary Imaging (21 @ 21:55) >  IMPRESSION:    NO DEFINITE VISUALIZATION OF THE GALLBLADDER THROUGH 4 HOURS POST-INJECTION, CONSISTENT WITH CHOLECYSTITIS, AS DESCRIBED ABOVE.  CLINICAL CORRELATION IS SUGGESTED.    < end of copied text >    MRCP   < from: MR MRCP No Cont (21 @ 16:53) >  IMPRESSION:  1.  Distended gallbladder with surrounding inflammatory changes, compatible with known acute cholecystitis.  2.  No intra-/extrahepatic biliary ductal dilation or evidence for choledocholithiasis.    < end of copied text >                                            --------------------------------------------------------------    PHYSICAL EXAM:    General: NAD, well-appearing  Cardiac: RRR S1, S2, no Murmurs, rubs or gallops  Lungs: Clear to auscultation bilaterally, no wheeze, rhonchi or crackles  Abdomen: Soft, non-distended, non-tender, no rebound, no guarding, bowel sounds present. Negative Morrison's sign. Negative Rovsing   Neuro: nonfocal, AAOx3   Musculoskeletal: moves all extremities  Skin: No rashes or jaundice.

## 2021-07-30 ENCOUNTER — TRANSCRIPTION ENCOUNTER (OUTPATIENT)
Age: 71
End: 2021-07-30

## 2021-07-30 VITALS
RESPIRATION RATE: 20 BRPM | SYSTOLIC BLOOD PRESSURE: 109 MMHG | TEMPERATURE: 97 F | DIASTOLIC BLOOD PRESSURE: 59 MMHG | HEART RATE: 72 BPM

## 2021-07-30 LAB
GLUCOSE BLDC GLUCOMTR-MCNC: 121 MG/DL — HIGH (ref 70–99)
GLUCOSE BLDC GLUCOMTR-MCNC: 159 MG/DL — HIGH (ref 70–99)
GLUCOSE BLDC GLUCOMTR-MCNC: 187 MG/DL — HIGH (ref 70–99)
GLUCOSE BLDC GLUCOMTR-MCNC: 200 MG/DL — HIGH (ref 70–99)
GLUCOSE BLDC GLUCOMTR-MCNC: 247 MG/DL — HIGH (ref 70–99)
GRAM STN FLD: SIGNIFICANT CHANGE UP
SPECIMEN SOURCE: SIGNIFICANT CHANGE UP

## 2021-07-30 PROCEDURE — 99024 POSTOP FOLLOW-UP VISIT: CPT

## 2021-07-30 RX ORDER — ACETAMINOPHEN 500 MG
2 TABLET ORAL
Qty: 0 | Refills: 0 | DISCHARGE
Start: 2021-07-30

## 2021-07-30 RX ORDER — INSULIN LISPRO 100/ML
VIAL (ML) SUBCUTANEOUS
Refills: 0 | Status: DISCONTINUED | OUTPATIENT
Start: 2021-07-30 | End: 2021-07-30

## 2021-07-30 RX ORDER — HYDROCHLOROTHIAZIDE 25 MG
25 TABLET ORAL DAILY
Refills: 0 | Status: DISCONTINUED | OUTPATIENT
Start: 2021-07-30 | End: 2021-07-30

## 2021-07-30 RX ORDER — MAGNESIUM SULFATE 500 MG/ML
2 VIAL (ML) INJECTION ONCE
Refills: 0 | Status: COMPLETED | OUTPATIENT
Start: 2021-07-30 | End: 2021-07-30

## 2021-07-30 RX ORDER — INSULIN LISPRO 100/ML
5 VIAL (ML) SUBCUTANEOUS ONCE
Refills: 0 | Status: COMPLETED | OUTPATIENT
Start: 2021-07-30 | End: 2021-07-30

## 2021-07-30 RX ADMIN — Medication 6 UNIT(S): at 07:55

## 2021-07-30 RX ADMIN — Medication 2: at 07:55

## 2021-07-30 RX ADMIN — AMLODIPINE BESYLATE 10 MILLIGRAM(S): 2.5 TABLET ORAL at 06:01

## 2021-07-30 RX ADMIN — Medication 650 MILLIGRAM(S): at 00:00

## 2021-07-30 RX ADMIN — Medication 1: at 11:30

## 2021-07-30 RX ADMIN — AMPICILLIN SODIUM AND SULBACTAM SODIUM 200 GRAM(S): 250; 125 INJECTION, POWDER, FOR SUSPENSION INTRAMUSCULAR; INTRAVENOUS at 06:01

## 2021-07-30 RX ADMIN — ENOXAPARIN SODIUM 40 MILLIGRAM(S): 100 INJECTION SUBCUTANEOUS at 12:16

## 2021-07-30 RX ADMIN — Medication 5 UNIT(S): at 13:52

## 2021-07-30 RX ADMIN — PANTOPRAZOLE SODIUM 40 MILLIGRAM(S): 20 TABLET, DELAYED RELEASE ORAL at 06:03

## 2021-07-30 RX ADMIN — Medication 650 MILLIGRAM(S): at 12:16

## 2021-07-30 RX ADMIN — Medication 650 MILLIGRAM(S): at 19:18

## 2021-07-30 RX ADMIN — Medication 6 UNIT(S): at 12:16

## 2021-07-30 RX ADMIN — VALSARTAN 320 MILLIGRAM(S): 80 TABLET ORAL at 12:16

## 2021-07-30 RX ADMIN — Medication 25 GRAM(S): at 00:26

## 2021-07-30 RX ADMIN — Medication 81 MILLIGRAM(S): at 13:06

## 2021-07-30 RX ADMIN — AMPICILLIN SODIUM AND SULBACTAM SODIUM 200 GRAM(S): 250; 125 INJECTION, POWDER, FOR SUSPENSION INTRAMUSCULAR; INTRAVENOUS at 13:06

## 2021-07-30 NOTE — DISCHARGE NOTE NURSING/CASE MANAGEMENT/SOCIAL WORK - PATIENT PORTAL LINK FT
You can access the FollowMyHealth Patient Portal offered by [facility] by registering at the following website: [email or web address]. By joining [employer]’s FollowMyHealth portal, you will also be able to view your health information using other applications (apps) compatible with our system.

## 2021-07-30 NOTE — DISCHARGE NOTE PROVIDER - CARE PROVIDER_API CALL
Perri Willoughby (MD)  Surgery  [address] [place] [address], [address] [place] [address]  Phone: [phone]  Fax: [phone]  Follow Up Time:

## 2021-07-30 NOTE — PROGRESS NOTE ADULT - ATTENDING COMMENTS
Pt. seen and examined.  He feels well, tolerating liquids, pain minimal (only at supraumbilical incision with movement), no SOB. He states that he was given the wrong dietary tray last night and mistakenly ate jello that contained high amounts of sugar.  This likely contributed to his elevated morning glucose. He now has the correct diet tray.    AVSS  A&OX3, NAD  Anicteric sclera  Abd soft, nontender, nondistended, incisions C/D/I  Ext warm    Labs reviewed.  AST/ALT/Alk phos elevated (likely from dissection of gallbladder off of liver bed), bilirubin normal.    A/P s/p laparoscopic cholecystectomy POD#1, doing well  Restart home medications, including Januvia  Advance to regular, low carbohydrate diet  D/C home later today  Pt to be given a Rx for repeat LFTs to be done prior to his follow up appointment with me to ensure normalization.  D/C home on po Abx (Augmentin) as per ID recommendations   Pt knows to call me for any questions/ concerns  Above relayed to patient's daughter as well.

## 2021-07-30 NOTE — DISCHARGE NOTE NURSING/CASE MANAGEMENT/SOCIAL WORK - NSDCVIVACCINE_GEN_ALL_CORE_FT
Tdap; 28-Feb-2018 14:35; Mikala Marie (RN); [employer]; [id number]; IntraMuscular; Deltoid Left.; 0.5 milliLiter(s); VIS (VIS Published: 09-May-2013, VIS Presented: 28-Feb-2018);

## 2021-07-30 NOTE — DISCHARGE NOTE PROVIDER - HOSPITAL COURSE
71M with PSH of RNYGB in 2012 with approximately 110lb weight loss presenting with 1 day history of RUQ abdominal pain sudden onset after having greasy zucchini pie. Patient endorses associated nausea, no vomiting. This pain has never happened before, no fevers/chills at home. Upon presentation to the ED patient febrile to 102, U/S showing cholelithiasis with stone in neck of gallbladder however no overt signs of cholecystitis. By the time patient evaluated by surgical team, pain had resolved. Patient still spiking fevers to 101. Patient had a HIDA performed which was positive. MRCP was also performed with findings of acute cholecystitis; no choledocolithiasis. Patient was evaluated by cardiology for risk stratification for laparoscopic cholecystectomy. Patient went to the operating room on 7/29/21 and was found to have a gangrenous gallbladder. Patient tolerated the procedure well, is tolerating diet, ambulating, and voiding. Patient is ready to be discharged from the hospital. Please have LFT panel drawn before your follow up visit.

## 2021-07-30 NOTE — PROGRESS NOTE ADULT - SUBJECTIVE AND OBJECTIVE BOX
CHRISTINA MARIO 70yo  Male came to the ER for c/o abd pain with episode of N and V after eating a fatty meal.  The pt states pain is severe 8/10 located in the epigastrium and RUQ, pressure like.  The pt has  + cholelithiasis on the U of abd with out marcelo abd wall thick or pericholecystic fluid.  The CT of abd  confirms the GB stones but no CBD dilatation or GB wall thick or pericholecystic fluid.  The pt had T of 102.  Pt states he drinks ETOH daily. The pt is being ad for  further w/up and management of pain and T.  The PMHx includes: HTMN, ASHD, DLD, DM II, COPD, MO, BMI 32, sp gastric bypass procedure, GERFD, cholelithiasis, OA, DDD, DJD, BPH.    INTERVAL HPI/OVERNIGHT EVENTS: pt cont with abd pain, TMacx over night 101, on Unasyn, Hida scan showed failure of GB visualization in 4 hrs c/w acute cholecystitis, surgery consult, Cardio FDr adamk for cardiac preop evaluation    MEDICATIONS  (STANDING):  amLODIPine   Tablet 10 milliGRAM(s) Oral daily  aspirin enteric coated 81 milliGRAM(s) Oral daily  chlorhexidine 4% Liquid 1 Application(s) Topical daily  dextrose 40% Gel 15 Gram(s) Oral once  dextrose 5%. 1000 milliLiter(s) (50 mL/Hr) IV Continuous <Continuous>  dextrose 5%. 1000 milliLiter(s) (100 mL/Hr) IV Continuous <Continuous>  dextrose 50% Injectable 25 Gram(s) IV Push once  dextrose 50% Injectable 12.5 Gram(s) IV Push once  dextrose 50% Injectable 25 Gram(s) IV Push once  enoxaparin Injectable 40 milliGRAM(s) SubCutaneous daily  glucagon  Injectable 1 milliGRAM(s) IntraMuscular once  insulin glargine Injectable (LANTUS) 17 Unit(s) SubCutaneous at bedtime  insulin lispro (ADMELOG) corrective regimen sliding scale   SubCutaneous three times a day before meals  insulin lispro Injectable (ADMELOG) 6 Unit(s) SubCutaneous three times a day before meals  pantoprazole    Tablet 40 milliGRAM(s) Oral before breakfast  simvastatin 20 milliGRAM(s) Oral at bedtime  tamsulosin 0.4 milliGRAM(s) Oral at bedtime  valsartan 320 milliGRAM(s) Oral daily  Unasyn 3gms q 6    MEDICATIONS  (PRN):  acetaminophen   Tablet .. 650 milliGRAM(s) Oral every 6 hours PRN Mild Pain (1 - 3)  traMADol 25 milliGRAM(s) Oral every 6 hours PRN Moderate Pain (4 - 6)      Allergies    sulfa drugs (Hives; Urticaria)    Intolerance	    Vital Signs Last 24 Hrs T max 102    T(F):  max 102, 99.4  HR: 83  BP: 127/69    RR: 19  SpO2: 95% RA    PHYSICAL EXAM:      Constitutional: pt A&O, overweight, uncomfortable but in NAD    Eyes:  nonicteric    ENMT: dry oral mucosa    Neck: short and thick, supple, no JVD, no bruits    Respiratory: shallow resp, scattered rhonchi, no crackles/rales/wheezing    Cardiovascular: s1S2 reg    Gastrointestinal: obese, distended, +BS, + tenderness in epigastrium and RUQ, no rebound or guarding    Genitourinary: no muller    Extremities: moves all ext    Vascular: + pedal pulses    Neurological: nonfocal    Skin: no rash    Lymph Nodes: not enlarged      Psychiatric: stable        LABS:                        12  11.6 )-----------( 146             36    136  |  98  |  18  ----------------------------<  164  3.9   |  28  |  1.2  GFR 50  Ca    9.1      27 Jul 2021 06:10  Phos  2.6     07-27  Mg     1.9     07-27  LA 1.4  IP 2.6  trop <0.01 x3    TPro  6.7  /  Alb  4.3  /  TBili  1.0  /  DBili  x   /  AST  26  /  ALT  33  /  AlkPhos  76  07-27          RADIOLOGY & ADDITIONAL TESTS:    EKG:  sinus 76.min, L axis, LAHB, occ PVC, pul patteren, no acute changes  U of abd:  liver nl contour, 14 cm, + cholelithiasis, CBD 0.4cm no GB wall thickening or pericholecystic fluid, pancreas not visualized    CT oa abd:   LLL granuloma, BL atelectasis, coronary atherosclerosis, + cholelithiasis,  no biliary ductal dilatation, spleen, pancreas, adrenal and kidneys WNL, L renal sm hypodensity, sp gastric bypass, scattered colonic diverticul, no bowel obs, no LN, no ascites, BL fat containing ing hernias  R>L, + deg disc changes, atherosclerosis of aorta    HIDA Nuc Scan: no def visualization of GB through 4hrs post inj c/w cholecystitis  
  CHRISTINA MARIO 72yo  Male came to the ER for c/o abd pain with episode of N and V after eating a fatty meal.  The pt states pain is severe 8/10 located in the epigastrium and RUQ, pressure like.  The pt has  + cholelithiasis on the U of abd with out marcelo abd wall thick or pericholecystic fluid.  The CT of abd  confirms the GB stones but no CBD dilatation or GB wall thick or pericholecystic fluid.  The pt had T of 102.  Pt states he drinks ETOH daily. The pt is being ad for  further w/up and management of pain and T.  The PMHx includes: HTMN, ASHD, DLD, DM II, COPD, MO, BMI 32, sp gastric bypass procedure, GERFD, cholelithiasis, OA, DDD, DJD, BPH.    INTERVAL HPI/OVERNIGHT EVENTS:    MEDICATIONS  (STANDING):  amLODIPine   Tablet 10 milliGRAM(s) Oral daily  aspirin enteric coated 81 milliGRAM(s) Oral daily  chlorhexidine 4% Liquid 1 Application(s) Topical daily  dextrose 40% Gel 15 Gram(s) Oral once  dextrose 5%. 1000 milliLiter(s) (50 mL/Hr) IV Continuous <Continuous>  dextrose 5%. 1000 milliLiter(s) (100 mL/Hr) IV Continuous <Continuous>  dextrose 50% Injectable 25 Gram(s) IV Push once  dextrose 50% Injectable 12.5 Gram(s) IV Push once  dextrose 50% Injectable 25 Gram(s) IV Push once  enoxaparin Injectable 40 milliGRAM(s) SubCutaneous daily  glucagon  Injectable 1 milliGRAM(s) IntraMuscular once  insulin glargine Injectable (LANTUS) 17 Unit(s) SubCutaneous at bedtime  insulin lispro (ADMELOG) corrective regimen sliding scale   SubCutaneous three times a day before meals  insulin lispro Injectable (ADMELOG) 6 Unit(s) SubCutaneous three times a day before meals  pantoprazole    Tablet 40 milliGRAM(s) Oral before breakfast  simvastatin 20 milliGRAM(s) Oral at bedtime  tamsulosin 0.4 milliGRAM(s) Oral at bedtime  valsartan 320 milliGRAM(s) Oral daily    MEDICATIONS  (PRN):  acetaminophen   Tablet .. 650 milliGRAM(s) Oral every 6 hours PRN Mild Pain (1 - 3)  traMADol 25 milliGRAM(s) Oral every 6 hours PRN Moderate Pain (4 - 6)      Allergies    sulfa drugs (Hives; Urticaria)    Intolerances        REVIEW OF SYSTEMS      General:	    Skin/Breast:  	  Ophthalmologic:  	  ENMT:	    Respiratory and Thorax:  	  Cardiovascular:	    Gastrointestinal:	    Genitourinary:	    Musculoskeletal:	    Neurological:	    Psychiatric:	    Hematology/Lymphatics:	    Endocrine:	    Allergic/Immunologic:	    Vital Signs Last 24 Hrs T max 102  T(C): 37.1 (27 Jul 2021 10:44), Max: 38.9 (27 Jul 2021 07:30)  T(F): 98.8 (27 Jul 2021 10:44), Max: 102.1 (27 Jul 2021 07:30)  HR: 71 (27 Jul 2021 10:44) (71 - 103)  BP: 113/56 (27 Jul 2021 10:44) (94/45 - 187/84)  BP(mean): 110 (26 Jul 2021 23:52) (110 - 120)  RR: 18 (27 Jul 2021 07:30) (16 - 18)  SpO2: 96% (27 Jul 2021 07:30) (96% - 100%)    PHYSICAL EXAM:      Constitutional: pt A&O, overweight, uncomfortable but in NAD    Eyes:  nonicteric    ENMT: dry oral mucosa    Neck: short and thick, supple, no JVD, no bruits    Respiratory: shallow resp, scattered rhonchi, no crackles/rales/wheezing    Cardiovascular: s1S2 reg    Gastrointestinal: obese, distended, +BS, + tenderness in epigastrium and RUQ, no rebound or guarding    Genitourinary: no muller    Extremities: moves all ext    Vascular: + pedal pulses    Neurological: nonfocal    Skin: no rash    Lymph Nodes: not enlarged      Psychiatric: stable        LABS:                        12.7   12.24 )-----------( 176      ( 27 Jul 2021 06:10 )             37.8     07-27    134<L>  |  95<L>  |  21<H>  ----------------------------<  256<H>  4.8   |  26  |  1.4  GFR 50  Ca    9.1      27 Jul 2021 06:10  Phos  2.6     07-27  Mg     1.9     07-27  LA 1.4  IP 2.6  trop <0.01 x3    TPro  6.7  /  Alb  4.3  /  TBili  1.0  /  DBili  x   /  AST  26  /  ALT  33  /  AlkPhos  76  07-27          RADIOLOGY & ADDITIONAL TESTS:    EKG:  sinus 76.min, L axis, LAHB, occ PVC, pul patteren, no acute changes  U of abd:  liver nl contour, 14 cm, + cholelithiasis, CBD 0.4cm no GB wall thickening or pericholecystic fluid, pancreas not visualized    CT oa abd:   LLL granuloma, BL atelectasis, coronary atherosclerosis, + cholelithiasis,  no biliary ductal dilatation, spleen, pancreas, adrenal and kidneys WNNL, L renal sm hypodensity, sp gastric bypass, scattered colonic diverticul, no bowel obs, no LN, no ascites, BL fat containing ing hernias  R>L, + deg disc changes, atherosclerosis of aorta  
  CHRISTINA MARIO 72yo  Male came to the ER for c/o abd pain with episode of N and V after eating a fatty meal.  The pt states pain is severe 8/10 located in the epigastrium and RUQ, pressure like.  The pt has  + cholelithiasis on the U of abd with out marcelo abd wall thick or pericholecystic fluid.  The CT of abd  confirms the GB stones but no CBD dilatation or GB wall thick or pericholecystic fluid.  The pt had T of 102.  Pt states he drinks ETOH daily. The pt is being ad for  further w/up and management of pain and T.  The PMHx includes: HTMN, ASHD, DLD, DM II, COPD, MO, BMI 32, sp gastric bypass procedure, GERFD, cholelithiasis, OA, DDD, DJD, BPH.    INTERVAL HPI/OVERNIGHT EVENTS: pt seen earlier. afebrile, Unsyn in place,  MRCP negative for choledocholithiasis, NPO for the OR for cholecystectomy    MEDICATIONS  (STANDING):  amLODIPine   Tablet 10 milliGRAM(s) Oral daily  aspirin enteric coated 81 milliGRAM(s) Oral daily  chlorhexidine 4% Liquid 1 Application(s) Topical daily  dextrose 40% Gel 15 Gram(s) Oral once  dextrose 5%. 1000 milliLiter(s) (50 mL/Hr) IV Continuous <Continuous>  dextrose 5%. 1000 milliLiter(s) (100 mL/Hr) IV Continuous <Continuous>  dextrose 50% Injectable 25 Gram(s) IV Push once  dextrose 50% Injectable 12.5 Gram(s) IV Push once  dextrose 50% Injectable 25 Gram(s) IV Push once  enoxaparin Injectable 40 milliGRAM(s) SubCutaneous daily  glucagon  Injectable 1 milliGRAM(s) IntraMuscular once  insulin glargine Injectable (LANTUS) 17 Unit(s) SubCutaneous at bedtime  insulin lispro (ADMELOG) corrective regimen sliding scale   SubCutaneous three times a day before meals  insulin lispro Injectable (ADMELOG) 6 Unit(s) SubCutaneous three times a day before meals  pantoprazole    Tablet 40 milliGRAM(s) Oral before breakfast  simvastatin 20 milliGRAM(s) Oral at bedtime  tamsulosin 0.4 milliGRAM(s) Oral at bedtime  valsartan 320 milliGRAM(s) Oral daily  Unasyn 3gms q 6    MEDICATIONS  (PRN):  acetaminophen   Tablet .. 650 milliGRAM(s) Oral every 6 hours PRN Mild Pain (1 - 3)  traMADol 25 milliGRAM(s) Oral every 6 hours PRN Moderate Pain (4 - 6)      Allergies    sulfa drugs (Hives; Urticaria)    Intolerance	    Vital Signs Last 24 Hrs T max 102    T(F):  max 98.3  HR: 83  BP: 127/69    RR: 19  SpO2: 95% RA    PHYSICAL EXAM:      Constitutional: pt A&O, overweight, uncomfortable but in NAD    Eyes:  nonicteric    ENMT: dry oral mucosa    Neck: short and thick, supple, no JVD, no bruits    Respiratory: shallow resp, scattered rhonchi, no crackles/rales/wheezing    Cardiovascular: s1S2 reg    Gastrointestinal: obese, sl distended, +BS, no tenderness    Genitourinary: no muller    Extremities: moves all ext    Vascular: + pedal pulses    Neurological: nonfocal    Skin: no rash    Lymph Nodes: not enlarged      Psychiatric: stable        LABS:    7/28                    12  11.6 )-----------( 146             36    136  |  98  |  18  ----------------------------<  164  3.9   |  28  |  1.2  GFR 50  Ca    9.1      27 Jul 2021 06:10  Phos  2.6     07-27  Mg     1.9     07-27  LA 1.4  IP 2.6  trop <0.01 x3    TPro  6.7  /  Alb  4.3  /  TBili  1.0  /  DBili  x   /  AST  26  /  ALT  33  /  AlkPhos  76  07-27          RADIOLOGY & ADDITIONAL TESTS:    EKG:  sinus 76.min, L axis, LAHB, occ PVC, pul patteren, no acute changes  U of abd:  liver nl contour, 14 cm, + cholelithiasis, CBD 0.4cm no GB wall thickening or pericholecystic fluid, pancreas not visualized    CT oa abd:   LLL granuloma, BL atelectasis, coronary atherosclerosis, + cholelithiasis,  no biliary ductal dilatation, spleen, pancreas, adrenal and kidneys WNL, L renal sm hypodensity, sp gastric bypass, scattered colonic diverticul, no bowel obs, no LN, no ascites, BL fat containing ing hernias  R>L, + deg disc changes, atherosclerosis of aorta    HIDA Nuc Scan: no def visualization of GB through 4hrs post inj c/w cholecystitis    RMRCP:  negative for choledocholithiasis
GENERAL SURGERY NOTE    Patient: CHRISTINA MARIO , 71y (48)Male   MRN: [id number]  Location: [facility] 011 A  Visit: 21 Inpatient      Overnight: no acute events. diaphoretic on exam on morning rounds      PAST MEDICAL & SURGICAL HISTORY:  GERD (gastroesophageal reflux disease)  HTN (hypertension)  HLD (hyperlipidemia)  BPH (benign prostatic hyperplasia)  S/P gastric bypass      Vitals:   T(F): 98.3 (21 @ 11:14), Max: 100.8 (21 @ 14:40)  HR: 80 (21 @ 11:14)  BP: 135/98 (21 @ 11:14)  RR: 18 (21 @ 11:14)  SpO2: 97% (21 @ 11:14)      Diet, NPO:   Except Medications  With Ice Chips/Sips of Water      Fluids:     I & O's:    21 @ 07:01  -  21 @ 07:00  --------------------------------------------------------  IN:    IV PiggyBack: 100 mL    Lactated Ringers: 700 mL    Lactated Ringers Bolus: 500 mL  Total IN: 1300 mL    OUT:  Total OUT: 0 mL    Total NET: 1300 mL        Bowel Movement: : [] YES [] NO  Flatus: : [] YES [] NO    PHYSICAL EXAM:  General Appearance: NAD  HEENT: EOMI, sclera non-icteric.  Heart: RRR   Lungs: No increased work of breathing or accessory muscle use. Symmetric chest wall rise and fall.   Abdomen:  Soft, nontender, nondistended.   MSK/Extremities: Warm & well-perfused.   Skin: Warm, dry. No jaundice.   Incision/wound: healing well, dressings in place, clean, dry and intact    MEDICATIONS  (STANDING):  amLODIPine   Tablet 10 milliGRAM(s) Oral daily  ampicillin/sulbactam  IVPB      ampicillin/sulbactam  IVPB 3 Gram(s) IV Intermittent every 6 hours  aspirin enteric coated 81 milliGRAM(s) Oral daily  chlorhexidine 4% Liquid 1 Application(s) Topical daily  dextrose 40% Gel 15 Gram(s) Oral once  dextrose 5%. 1000 milliLiter(s) (50 mL/Hr) IV Continuous <Continuous>  dextrose 5%. 1000 milliLiter(s) (100 mL/Hr) IV Continuous <Continuous>  dextrose 50% Injectable 25 Gram(s) IV Push once  dextrose 50% Injectable 12.5 Gram(s) IV Push once  dextrose 50% Injectable 25 Gram(s) IV Push once  enoxaparin Injectable 40 milliGRAM(s) SubCutaneous daily  glucagon  Injectable 1 milliGRAM(s) IntraMuscular once  insulin glargine Injectable (LANTUS) 17 Unit(s) SubCutaneous at bedtime  insulin lispro (ADMELOG) corrective regimen sliding scale   SubCutaneous three times a day before meals  insulin lispro Injectable (ADMELOG) 6 Unit(s) SubCutaneous three times a day before meals  lactated ringers. 500 milliLiter(s) (50 mL/Hr) IV Continuous <Continuous>  pantoprazole    Tablet 40 milliGRAM(s) Oral before breakfast  simvastatin 20 milliGRAM(s) Oral at bedtime  tamsulosin 0.4 milliGRAM(s) Oral at bedtime  valsartan 320 milliGRAM(s) Oral daily    MEDICATIONS  (PRN):  acetaminophen   Tablet .. 650 milliGRAM(s) Oral every 6 hours PRN Temp greater or equal to 38C (100.4F), Mild Pain (1 - 3)  traMADol 25 milliGRAM(s) Oral every 6 hours PRN Moderate Pain (4 - 6)      DVT PROPHYLAXIS: enoxaparin Injectable 40 milliGRAM(s) SubCutaneous daily    GI PROPHYLAXIS: pantoprazole    Tablet 40 milliGRAM(s) Oral before breakfast    ANTICOAGULATION:   ANTIBIOTICS:  ampicillin/sulbactam  IVPB    ampicillin/sulbactam  IVPB 3 Gram(s)            LAB/STUDIES:  Labs:  CAPILLARY BLOOD GLUCOSE      POCT Blood Glucose.: 132 mg/dL (2021 07:38)  POCT Blood Glucose.: 134 mg/dL (2021 21:25)  POCT Blood Glucose.: 176 mg/dL (2021 17:31)                          10.7   9.90  )-----------( 132      ( 2021 21:26 )             32.4         -    135  |  98  |  17  ----------------------------<  131<H>  3.9   |  28  |  1.1      Calcium, Total Serum: 8.5 mg/dL (21 @ 21:26)      LFTs:             5.9  | 1.1  | 19       ------------------[65      ( 2021 21:26 )  3.5  | 0.3  | 22          Lipase:x      Amylase:x         Lactate, Blood: 1.4 mmol/L (21 @ 06:10)      Coags:     13.10  ----< 1.14    ( 2021 21:26 )     35.8                Urinalysis Basic - ( 2021 11:15 )    Color: Yellow / Appearance: Clear / S.028 / pH: x  Gluc: x / Ketone: Trace  / Bili: Negative / Urobili: <2 mg/dL   Blood: x / Protein: 100 mg/dL / Nitrite: Negative   Leuk Esterase: Negative / RBC: 4 /HPF / WBC 1 /HPF   Sq Epi: x / Non Sq Epi: 0 /HPF / Bacteria: Negative        Culture - Blood (collected 2021 22:06)  Source: .Blood None  Preliminary Report (2021 07:01):    No growth to date.        IMAGING:  < from: CT Abdomen and Pelvis w/ IV Cont (21 @ 01:51) >  IMPRESSION:  No CT evidence of acute intra-abdominal infectious/inflammatory process.    < end of copied text >    < from: US Abdomen Upper Quadrant Right (21 @ 01:43) >  FINDINGS:  Liver: Hyperechoic homogeneous liver parenchyma. Smooth liver contour. 14.8 cm in length  Bile ducts: Normal caliber. Common bile duct measures 0.4 cm.  Gallbladder: Cholelithiasis. Negative sonographic Morrison's sign. No gallbladder wall thickening or pericholecystic fluid.  Pancreas: Obscured by overlying bowel gas.  Right kidney: 8.2 cm in length. No hydronephrosis. Renal vascular flow is demonstrated  Ascites: None.  IVC: Visualized portions are within normal limits.    IMPRESSION:  Cholelithiasis without definite evidence of cholecystitis. If clinically warranted, nuclear medicine HIDA scan may be of benefit.  < end of copied text >    < from: MR MRCP No Cont (21 @ 16:53) >  IMPRESSION:  1.  Distended gallbladder with surrounding inflammatory changes, compatible with known acute cholecystitis.  2.  No intra-/extrahepatic biliary ductal dilation or evidence for choledocholithiasis.  --- End of Report ---  < end of copied text >      ACCESS DEVICES:  [x ] Peripheral IV    BLUE TEAM SPECTRA #[id number]
GENERAL SURGERY PROGRESS NOTE    Patient: CHRISTINA MARIO , 71y (48)Male   MRN: [id number]  Location: [address]  Visit: 21 Inpatient  Date: 21 @ 07:39        Admitted :21 (3d)  LOS: 3d    Procedure/Dx/Injuries: s/p laparoscopic cholecystectomy POD#1, for acute cholecystitis    Events of past 24 hours: Patient seen and examined at bedside. No acute events overnight. Afebrile, VSS.    PAST MEDICAL & SURGICAL HISTORY:  GERD (gastroesophageal reflux disease)    HTN (hypertension)    HLD (hyperlipidemia)    BPH (benign prostatic hyperplasia)    S/P gastric bypass        Vitals:   T(F): 97.3 (21 @ 04:00), Max: 98.7 (21 @ 16:45)  HR: 74 (21 @ 05:56)  BP: 142/65 (21 @ 05:56)  RR: 18 (21 @ 05:56)  SpO2: 96% (21 @ 05:56)      Diet, Consistent Carbohydrate Clear Liquid      Fluids: lactated ringers Bolus:   1000 milliLiter(s), IV Bolus, once, infuse over 60 Minute(s), Stop After 1 Doses  lactated ringers.: Solution, 500 milliLiter(s) infuse at 50 mL/Hr  lactated ringers.: Solution, 1000 milliLiter(s) infuse at 75 mL/Hr  Provider's Contact #: [phone]      I & O's:    21 @ 07:01  -  21 @ 07:00  --------------------------------------------------------  IN:    IV PiggyBack: 250 mL    Lactated Ringers: 600 mL    Oral Fluid: 480 mL  Total IN: 1330 mL    OUT:    Voided (mL): 850 mL  Total OUT: 850 mL    Total NET: 480 mL        Bowel Movement: : no  Flatus: : no    PHYSICAL EXAM:  General Appearance: NAD  HEENT: EOMI, sclera non-icteric.  Heart: RRR   Lungs: No increased work of breathing or accessory muscle use. Symmetric chest wall rise and fall.   Abdomen:  Soft, nontender, nondistended.   MSK/Extremities: Warm & well-perfused.   Skin: Warm, dry. No jaundice.   Incision/wound: healing well, dressings in place, clean, dry and intact    MEDICATIONS  (STANDING):  acetaminophen   Tablet .. 650 milliGRAM(s) Oral every 6 hours  amLODIPine   Tablet 10 milliGRAM(s) Oral daily  ampicillin/sulbactam  IVPB 3 Gram(s) IV Intermittent every 6 hours  aspirin enteric coated 81 milliGRAM(s) Oral daily  chlorhexidine 4% Liquid 1 Application(s) Topical daily  dextrose 40% Gel 15 Gram(s) Oral once  dextrose 5%. 1000 milliLiter(s) (50 mL/Hr) IV Continuous <Continuous>  dextrose 5%. 1000 milliLiter(s) (100 mL/Hr) IV Continuous <Continuous>  dextrose 50% Injectable 25 Gram(s) IV Push once  dextrose 50% Injectable 12.5 Gram(s) IV Push once  dextrose 50% Injectable 25 Gram(s) IV Push once  enoxaparin Injectable 40 milliGRAM(s) SubCutaneous daily  glucagon  Injectable 1 milliGRAM(s) IntraMuscular once  insulin glargine Injectable (LANTUS) 17 Unit(s) SubCutaneous at bedtime  insulin lispro (ADMELOG) corrective regimen sliding scale   SubCutaneous three times a day before meals  insulin lispro Injectable (ADMELOG) 6 Unit(s) SubCutaneous three times a day before meals  lactated ringers Bolus 1000 milliLiter(s) IV Bolus once  lactated ringers. 500 milliLiter(s) (50 mL/Hr) IV Continuous <Continuous>  lactated ringers. 1000 milliLiter(s) (75 mL/Hr) IV Continuous <Continuous>  pantoprazole    Tablet 40 milliGRAM(s) Oral before breakfast  simvastatin 20 milliGRAM(s) Oral at bedtime  tamsulosin 0.4 milliGRAM(s) Oral at bedtime  valsartan 320 milliGRAM(s) Oral daily    MEDICATIONS  (PRN):  HYDROmorphone  Injectable 0.5 milliGRAM(s) IV Push every 10 minutes PRN Moderate Pain (4 - 6)  ondansetron Injectable 4 milliGRAM(s) IV Push once PRN Nausea and/or Vomiting  traMADol 25 milliGRAM(s) Oral every 6 hours PRN Moderate Pain (4 - 6)      DVT PROPHYLAXIS: enoxaparin Injectable 40 milliGRAM(s) SubCutaneous daily    GI PROPHYLAXIS: pantoprazole    Tablet 40 milliGRAM(s) Oral before breakfast    ANTICOAGULATION:   ANTIBIOTICS:  ampicillin/sulbactam  IVPB 3 Gram(s)            LAB/STUDIES:  Labs:  CAPILLARY BLOOD GLUCOSE  POCT Blood Glucose.: 283 mg/dL (2021 22:07)                          11.0   8.99  )-----------( 130      ( 2021 20:26 )             33.7       Auto Neutrophil %: 75.6 % (21 @ 20:26)  Auto Immature Granulocyte %: 0.4 % (21 @ 20:26)        130<L>  |  94<L>  |  20  ----------------------------<  191<H>  4.5   |  22  |  1.0      Calcium, Total Serum: 8.2 mg/dL (21 @ 20:26)      LFTs:             5.7  | 1.1  | 252      ------------------[190     ( 2021 20:26 )  3.2  | 0.7  | 108              Coags:     13.10  ----< 1.14    ( 2021 21:26 )     35.8                Urinalysis Basic - ( 2021 11:15 )    Color: Yellow / Appearance: Clear / S.028 / pH: x  Gluc: x / Ketone: Trace  / Bili: Negative / Urobili: <2 mg/dL   Blood: x / Protein: 100 mg/dL / Nitrite: Negative   Leuk Esterase: Negative / RBC: 4 /HPF / WBC 1 /HPF   Sq Epi: x / Non Sq Epi: 0 /HPF / Bacteria: Negative        Culture - Body Fluid with Gram Stain (collected 2021 14:32)  Source: .Body Fluid None  Gram Stain (2021 03:15):    polymorphonuclear leukocytes seen    Gram Variable Rods seen    by cytocentrifuge    Culture - Blood (collected 2021 22:06)  Source: .Blood None  Preliminary Report (2021 07:01):    No growth to date.        ACCESS/ DEVICES:  [x] Peripheral IV  [ ] Central Venous Line	[ ] R	[ ] L	[ ] IJ	[ ] Fem	[ ] SC	Placed:   [ ] Arterial Line		[ ] R	[ ] L	[ ] Fem	[ ] Rad	[ ] Ax	Placed:   [ ] PICC:					[ ] Mediport  [ ] Urinary Catheter,  Date Placed:   [ ] Chest tube: [ ] Right, [ ] Left  [ ] PERLA/Darius Drains
GENERAL SURGERY NOTE    Patient: CHRISTINA MARIO , 71y (48)Male   MRN: [id number]  Location: [facility] 011 A  Visit: 21 Inpatient    HPI:  72 yo male , H/o DM II, HTN BPH and gastric Bypass presented for abdominal pain  Pain woke up yesterday with Nausea and one episode of vomiting. He had fatty breakfast and lunch  at 4PM he started to have RUQ and epigastric pain rate 8/10, non radiating, constant, pressure like, associated with anorexia.  No fever, chills, headache, diarrhea or constipation  No chest pain, palpitations or SOB  No similar episodes in the past  Pain was barely relieved by ASA and Nitroglycerin given by EMS  Patient reports drinking alcohol on daily basis 2 cups/day  ED vitals remarkable for hypertension Sbp 180 mmhg (2021 04:02)    Surgical consult:  71M with PSH of RNYGB in  with an approx 110lb weight loss presenting with 1 day history of RUQ abdominal pain sudden onset after having greasy zucchini pie. Patient endorses associated N, no vomiting. Has been having BM (last BM yesterday AM), and tolerating diet last meal was 4pm day prior to presentation. This pain has never happened before, no F/chills at home. Upon presentation to the ED patient febrile to 102, U/S showing cholelithiasis with stone in neck of gallbladder however no overt signs of cholecystitis. By the time patient evaluated by surgical team, pain had resolved. Patient still spiking fevers to 101 does not endorse feeling as if he has F/chills.    Of note patient follows with Dr. Banerjee (cardiology) and had negative stress test in 3/2021. No recent EGD/colonoscopy, patient does endorse to apprx 3 cups of bourban every night, no history of NSAID use, no heartburn or epigastric pain prior to this episode.    Overnight: no acute events. diaphoretic on exam on morning rounds      PAST MEDICAL & SURGICAL HISTORY:  GERD (gastroesophageal reflux disease)  HTN (hypertension)  HLD (hyperlipidemia)  BPH (benign prostatic hyperplasia)  S/P gastric bypass      Home Medications:  aspirin 81 mg oral tablet: 1 tab(s) orally once a day (2021 03:59)  Januvia 25 mg oral tablet: 1 tab(s) orally once a day (2021 03:59)  simvastatin 20 mg oral tablet: 1 tab(s) orally once a day (at bedtime) (2021 03:59)  tamsulosin 0.4 mg oral capsule: 1 cap(s) orally once a day (2021 03:59)  valsartan-hydrochlorothiazide 320mg-25mg oral tablet: 1 tab(s) orally once a day (2021 03:59)      Vitals:   T(F): 99.4 (21 @ 07:49), Max: 102 (21 @ 05:47)  HR: 83 (21 @ 07:49)  BP: 127/69 (21 @ 07:49)  RR: 18 (21 @ 05:47)  SpO2: 95% (21 @ 07:49)      Diet, NPO:   Except Medications  With Ice Chips/Sips of Water      Fluids: lactated ringers.: Solution, 500 milliLiter(s) infuse at 50 mL/Hr      I & O's:    Bowel Movement: : [] YES [] NO  Flatus: : [] YES [] NO    PHYSICAL EXAM:  General Appearance: NAD  HEENT: EOMI, sclera non-icteric.  Heart: RRR   Lungs: No increased work of breathing or accessory muscle use. Symmetric chest wall rise and fall.   Abdomen:  Soft, nontender, nondistended.   MSK/Extremities: Warm & well-perfused.   Skin: Warm, dry. No jaundice.   Incision/wound: healing well, dressings in place, clean, dry and intact    MEDICATIONS  (STANDING):  amLODIPine   Tablet 10 milliGRAM(s) Oral daily  ampicillin/sulbactam  IVPB      ampicillin/sulbactam  IVPB 3 Gram(s) IV Intermittent every 6 hours  aspirin enteric coated 81 milliGRAM(s) Oral daily  chlorhexidine 4% Liquid 1 Application(s) Topical daily  dextrose 40% Gel 15 Gram(s) Oral once  dextrose 5%. 1000 milliLiter(s) (50 mL/Hr) IV Continuous <Continuous>  dextrose 5%. 1000 milliLiter(s) (100 mL/Hr) IV Continuous <Continuous>  dextrose 50% Injectable 25 Gram(s) IV Push once  dextrose 50% Injectable 12.5 Gram(s) IV Push once  dextrose 50% Injectable 25 Gram(s) IV Push once  enoxaparin Injectable 40 milliGRAM(s) SubCutaneous daily  glucagon  Injectable 1 milliGRAM(s) IntraMuscular once  insulin glargine Injectable (LANTUS) 17 Unit(s) SubCutaneous at bedtime  insulin lispro (ADMELOG) corrective regimen sliding scale   SubCutaneous three times a day before meals  insulin lispro Injectable (ADMELOG) 6 Unit(s) SubCutaneous three times a day before meals  lactated ringers. 500 milliLiter(s) (50 mL/Hr) IV Continuous <Continuous>  pantoprazole    Tablet 40 milliGRAM(s) Oral before breakfast  simvastatin 20 milliGRAM(s) Oral at bedtime  tamsulosin 0.4 milliGRAM(s) Oral at bedtime  valsartan 320 milliGRAM(s) Oral daily    MEDICATIONS  (PRN):  acetaminophen   Tablet .. 650 milliGRAM(s) Oral every 6 hours PRN Mild Pain (1 - 3)  traMADol 25 milliGRAM(s) Oral every 6 hours PRN Moderate Pain (4 - 6)      DVT PROPHYLAXIS: enoxaparin Injectable 40 milliGRAM(s) SubCutaneous daily    GI PROPHYLAXIS: pantoprazole    Tablet 40 milliGRAM(s) Oral before breakfast    ANTICOAGULATION:   ANTIBIOTICS:  ampicillin/sulbactam  IVPB    ampicillin/sulbactam  IVPB 3 Gram(s)            LAB/STUDIES:  Labs:  CAPILLARY BLOOD GLUCOSE      POCT Blood Glucose.: 159 mg/dL (2021 11:33)  POCT Blood Glucose.: 171 mg/dL (2021 07:38)  POCT Blood Glucose.: 198 mg/dL (2021 22:16)                          12.3   11.66 )-----------( 146      ( 2021 08:45 )             36.9       Auto Immature Granulocyte %: 0.5 % (21 @ 08:45)  Auto Neutrophil %: 66.7 % (21 @ 08:45)        136  |  98  |  18  ----------------------------<  164<H>  3.9   |  28  |  1.2      Phosphorus Level, Serum: 3.5 mg/dL (21 @ 08:45)      LFTs:             6.2  | 1.4  | 20       ------------------[67      ( 2021 08:45 )  3.6  | 0.4  | 24          Lipase:x      Amylase:x         Lactate, Blood: 1.4 mmol/L (21 @ 06:10)      Coags:     11.30  ----< 0.98    ( 2021 11:27 )     34.0        CARDIAC MARKERS ( 2021 06:10 )  x     / <0.01 ng/mL / x     / x     / x      CARDIAC MARKERS ( 2021 00:57 )  x     / <0.01 ng/mL / x     / x     / x      CARDIAC MARKERS ( 2021 20:54 )  x     / <0.01 ng/mL / x     / x     / x              Urinalysis Basic - ( 2021 11:15 )    Color: Yellow / Appearance: Clear / S.028 / pH: x  Gluc: x / Ketone: Trace  / Bili: Negative / Urobili: <2 mg/dL   Blood: x / Protein: 100 mg/dL / Nitrite: Negative   Leuk Esterase: Negative / RBC: x / WBC x   Sq Epi: x / Non Sq Epi: x / Bacteria: x        BLUE TEAM SPECTRA #[id number]    IMAGING:  < from: CT Abdomen and Pelvis w/ IV Cont (21 @ 01:51) >  IMPRESSION:      No CT evidence of acute intra-abdominal infectious/inflammatory process.    < end of copied text >  < from: US Abdomen Upper Quadrant Right (21 @ 01:43) >  FINDINGS:    Liver: Hyperechoic homogeneous liver parenchyma. Smooth liver contour. 14.8 cm in length  Bile ducts: Normal caliber. Common bile duct measures 0.4 cm.  Gallbladder: Cholelithiasis. Negative sonographic Morrison's sign. No gallbladder wall thickening or pericholecystic fluid.  Pancreas: Obscured by overlying bowel gas.  Right kidney: 8.2 cm in length. No hydronephrosis. Renal vascular flow is demonstrated  Ascites: None.  IVC: Visualized portions are within normal limits.    IMPRESSION:    Cholelithiasis without definite evidence of cholecystitis. If clinically warranted, nuclear medicine HIDA scan may be of benefit.    < end of copied text >      ACCESS DEVICES:  [x ] Peripheral IV

## 2021-07-30 NOTE — DISCHARGE NOTE PROVIDER - NSDCCPCAREPLAN_GEN_ALL_CORE_FT
PRINCIPAL DISCHARGE DIAGNOSIS  Diagnosis: Acute cholecystitis  Assessment and Plan of Treatment: Activity: No heavy lifting > 10 lb, avoid straining or excessive activity x 6 weeks.   Antibiotics: Augmentin was sent to your pharmacy ([facility] [address]).   Dressings: Remove outer dressings in 48 hours and steri strips underneath will fall off on their own. Do not scrub wounds. You may shower but do not bathe. May use ice packs for pain and swelling.   Pain control: You may take over-the-counter tylenol and motrin three times per day with food for up to 3 days.   Follow up: Please call the number provided to make an appointment with Dr. Willoughby in 1-2 weeks. Please call with any questions or concerns including fevers, worsening pain, pus from the wounds, or redness of the skin. Please have LFT panel drawn before your follow up visit.      SECONDARY DISCHARGE DIAGNOSES  Diagnosis: Cholelithiasis  Assessment and Plan of Treatment:     Diagnosis: Hypomagnesemia  Assessment and Plan of Treatment:

## 2021-07-30 NOTE — PROGRESS NOTE ADULT - ASSESSMENT
71y M admitted s/p laparoscopic cholecystectomy POD#1 with the above physical exam, labs and imaging.   Patient seen and examined at bedside. NAD.  Tolerating Diet:  Diet, Consistent Carbohydrate Clear Liquid (07-29-21 @ 16:50) [Active]      PLAN:  - Monitor blood sugars, restart home DM meds  - advance to   - Monitor vitals  - Monitor labs and replete as necessary  - Monitor for bowel function  - Encourage ambulation as tolerated  - Monitor urine output  - DVT and GI Prophylaxis  - discharge planning      Lines/Tubes: PIV    BLUE TEAM SPECTRA [id number]
71yM w/ PMHx of RNYGB in 2012 with 110 lb weight loss, HTN, HLD presenting with 1 day history of RUQ abdominal pain likely secondary to biliary colic however in the ED found to be febriile to Tm 102. hida was positive. no visualization of GB    Plan:  - MRCP today  - OR tomorrow  - pre op labs type and screen coags ekg cxr  - f/u UA  - NPO, IVF, Unasyn  - daily LFTs, CBC  - trend WBC and fever curve  - Cardiology consult for preoperative risk stratification for possible laparoscopic cholecystectomy this admission (Dr. Banerjee)    Lines: PANCHO    BLUE [id number]  
71yM w/ PMHx of RNYGB in 2012 with 110 lb weight loss, HTN, HLD presenting with 1 day history of RUQ abdominal pain likely secondary to biliary colic however in the ED found to be febriile to Tm 102. hida was positive. no visualization of GB    Plan:  - OR today, lap yaa, possible ioc, possible open  - NPO, IVF, Unasyn  - MRCP negative      Lines: PIV    BLUE [id number]  
Abdominal pain of unclear etiology, R/O cholecystisis, Errosive Gastritis  Hx of Cholelithiasis  Hx of MO, sp Gastric Bypass, BMI 32  Hx of HTN, ASHD  Hx of DLD  hx of COPD  Hx of GERD, diverticulosis  Hx of OA, DDD, DJD  Hx of BPH    pt afebrile  MRCP no choledocholithiasis  NPO for OR for cholecystectomy  ABx :  Unasyn 3gms q6  U of abd and CT of abd reviewed, + cholelithiasis but no signs of cholecystitis  GI consult for further evaluation  HIDA scan did not show GB visualization x 4 hrs c/w cholecystitis  Surgery consult  Cardio: pt cleared nuc stress test 3/21 normal  ID consult  NPO  IV fluids   GI prophylaxis  Zofran for nausea  cont home meds  monitor electrolytes, check A1C 8.4, , TSH, lipase and amylase, ETOH level    
Abdominal pain of unclear etiology, R/O cholecystisis, Errosive Gastritis  Hx of Cholelithiasis  Hx of MO, sp Gastric Bypass, BMI 32  Hx of HTN, ASHD  Hx of DLD  hx of COPD  Hx of GERD, diverticulosis  Hx of OA, DDD, DJD  Hx of BPH  Hx of daily ETOH    pt had T of 102 in the last 24 hrs, blood and urin C&S  U of abd and CT of abd reviewed, + cholelithiasis but no signs of cholecystitis  GI consult for further evaluation ? HIDA scan  NPO  IV fluids   GI prophylaxis  Zofran for nausea  cont home meds  monitor electrolytes, check A1C, TSH, lipase and amylase, ETOH level    
Abdominal pain of unclear etiology, R/O cholecystisis, Errosive Gastritis  Hx of Cholelithiasis  Hx of MO, sp Gastric Bypass, BMI 32  Hx of HTN, ASHD  Hx of DLD  hx of COPD  Hx of GERD, diverticulosis  Hx of OA, DDD, DJD  Hx of BPH  Hx of daily ETOH    pt had T of 101 max  ABx :  Unasyn 3gms q6  U of abd and CT of abd reviewed, + cholelithiasis but no signs of cholecystitis  GI consult for further evaluation  HIDA scan did not show GB visualization x 4 hrs c/w cholecystitis  Surgery consult  Cardio consult, Dr Banerjee for preop evaluation  ID consult  NPO  IV fluids   GI prophylaxis  Zofran for nausea  cont home meds  monitor electrolytes, check A1C, TSH, lipase and amylase, ETOH level

## 2021-07-30 NOTE — DISCHARGE NOTE PROVIDER - NSDCMRMEDTOKEN_GEN_ALL_CORE_FT
acetaminophen 325 mg oral tablet: 2 tab(s) orally every 6 hours  amoxicillin-clavulanate 875 mg-125 mg oral tablet: 1 tab(s) orally every 12 hours   aspirin 81 mg oral tablet: 1 tab(s) orally once a day  Januvia 25 mg oral tablet: 1 tab(s) orally once a day  simvastatin 20 mg oral tablet: 1 tab(s) orally once a day (at bedtime)  tamsulosin 0.4 mg oral capsule: 1 cap(s) orally once a day  valsartan-hydrochlorothiazide 320mg-25mg oral tablet: 1 tab(s) orally once a day

## 2021-07-30 NOTE — PROGRESS NOTE ADULT - ATTENDING SUPERVISION STATEMENT
----- Message from Mckenna Palencia NP sent at 11/30/2018  3:59 PM CST -----  Please let patient know Pap was normal/negative.  
Resident

## 2021-07-30 NOTE — PROGRESS NOTE ADULT - REASON FOR ADMISSION
abdominal pain
abdominal pain, N and V
abdominal pain, fever

## 2021-07-31 LAB
-  AMIKACIN: SIGNIFICANT CHANGE UP
-  AMOXICILLIN/CLAVULANIC ACID: SIGNIFICANT CHANGE UP
-  AMPICILLIN/SULBACTAM: SIGNIFICANT CHANGE UP
-  AMPICILLIN: SIGNIFICANT CHANGE UP
-  AZTREONAM: SIGNIFICANT CHANGE UP
-  CEFAZOLIN: SIGNIFICANT CHANGE UP
-  CEFEPIME: SIGNIFICANT CHANGE UP
-  CEFOXITIN: SIGNIFICANT CHANGE UP
-  CEFTRIAXONE: SIGNIFICANT CHANGE UP
-  CIPROFLOXACIN: SIGNIFICANT CHANGE UP
-  ERTAPENEM: SIGNIFICANT CHANGE UP
-  GENTAMICIN: SIGNIFICANT CHANGE UP
-  IMIPENEM: SIGNIFICANT CHANGE UP
-  LEVOFLOXACIN: SIGNIFICANT CHANGE UP
-  MEROPENEM: SIGNIFICANT CHANGE UP
-  PIPERACILLIN/TAZOBACTAM: SIGNIFICANT CHANGE UP
-  TOBRAMYCIN: SIGNIFICANT CHANGE UP
-  TRIMETHOPRIM/SULFAMETHOXAZOLE: SIGNIFICANT CHANGE UP
METHOD TYPE: SIGNIFICANT CHANGE UP

## 2021-08-02 LAB
CULTURE RESULTS: SIGNIFICANT CHANGE UP
SPECIMEN SOURCE: SIGNIFICANT CHANGE UP

## 2021-08-02 NOTE — CDI QUERY NOTE - NSCDIOTHERTXTBX_GEN_ALL_CORE_HH
Documentation  7/27/2021 HPI: 71M  … H/o DM II, HTN BPH and gastric Bypass presented for abdominal pain, N/V, RUQ and epigastric pain rate 8/10, non radiating, constant, pressure like, associated with anorexia.  7/29 S/P cholecystectomy for acute Cholecystitis.  7/29 ID consult>Resolved sepsis secondary to acute cholecystitis  Vital Signs  7/27 Nursing Flow Sheet  T 102.2  RR 18  Laboratory Findings  7/27 WBC 11.51-12.24   7/28 Ferritin 924  Treatment  7/27 -7/29 Unasyn IVPB; Indication: acute Cholecystitis  8/1 Discharged Medication Antibiotics: Augmentin po  Based on your professional judgment and above clinical findings can the Infectious Disease diagnosis of sepsis be further specified as :            -Sepsis ruled in             -Sepsis ruled out            -Other please specify           -Clinically unable to determine

## 2021-08-03 ENCOUNTER — NON-APPOINTMENT (OUTPATIENT)
Age: 71
End: 2021-08-03

## 2021-08-03 LAB
CULTURE RESULTS: SIGNIFICANT CHANGE UP
ORGANISM # SPEC MICROSCOPIC CNT: SIGNIFICANT CHANGE UP
SPECIMEN SOURCE: SIGNIFICANT CHANGE UP

## 2021-08-04 PROBLEM — N40.0 BENIGN PROSTATIC HYPERPLASIA WITHOUT LOWER URINARY TRACT SYMPTOMS: Chronic | Status: ACTIVE | Noted: 2021-07-27

## 2021-08-06 ENCOUNTER — APPOINTMENT (OUTPATIENT)
Dept: SURGERY | Facility: CLINIC | Age: 71
End: 2021-08-06
Payer: MEDICARE

## 2021-08-06 VITALS
SYSTOLIC BLOOD PRESSURE: 130 MMHG | TEMPERATURE: 95.5 F | BODY MASS INDEX: 30.12 KG/M2 | WEIGHT: 183 LBS | HEART RATE: 95 BPM | DIASTOLIC BLOOD PRESSURE: 70 MMHG | HEIGHT: 65.5 IN

## 2021-08-06 PROCEDURE — 99024 POSTOP FOLLOW-UP VISIT: CPT

## 2021-08-06 RX ORDER — VALSARTAN 320 MG/1
320 TABLET ORAL DAILY
Refills: 0 | Status: DISCONTINUED | COMMUNITY
End: 2021-08-06

## 2021-08-06 RX ORDER — SITAGLIPTIN AND METFORMIN HYDROCHLORIDE 50; 1000 MG/1; MG/1
50-1000 TABLET, FILM COATED ORAL DAILY
Refills: 0 | Status: DISCONTINUED | COMMUNITY
End: 2021-08-06

## 2021-08-06 NOTE — HISTORY OF PRESENT ILLNESS
[de-identified] : Mr. Gardner is recovering well from his laparoscopic cholecystectomy on 7/30/2021 for gangrenous cholecystitis.  He is tolerating a low fat diet, having bowel movements, no abdominal pain. He does feel tired.  He had repeat LFTs done, which were normalizing. His transaminases and Alk Phos were elevated POD#1 but his Bilirubin was normal.

## 2021-08-06 NOTE — PLAN
[FreeTextEntry1] : No heavy lifting\par Blood work (full bariatric panel, as pt is s/p gastric bypass)\par Follow up in 2 weeks for post op check

## 2021-08-06 NOTE — PHYSICAL EXAM
[de-identified] : A&OX3, NAD [de-identified] : anicteric sclera [de-identified] : soft, NT, ND, incisions healing well, old ecchymosis at umbilical incision

## 2021-08-09 DIAGNOSIS — R07.9 CHEST PAIN, UNSPECIFIED: ICD-10-CM

## 2021-08-09 DIAGNOSIS — F10.10 ALCOHOL ABUSE, UNCOMPLICATED: ICD-10-CM

## 2021-08-09 DIAGNOSIS — D64.9 ANEMIA, UNSPECIFIED: ICD-10-CM

## 2021-08-09 DIAGNOSIS — Z79.84 LONG TERM (CURRENT) USE OF ORAL HYPOGLYCEMIC DRUGS: ICD-10-CM

## 2021-08-09 DIAGNOSIS — E11.22 TYPE 2 DIABETES MELLITUS WITH DIABETIC CHRONIC KIDNEY DISEASE: ICD-10-CM

## 2021-08-09 DIAGNOSIS — K80.00 CALCULUS OF GALLBLADDER WITH ACUTE CHOLECYSTITIS WITHOUT OBSTRUCTION: ICD-10-CM

## 2021-08-09 DIAGNOSIS — I25.10 ATHEROSCLEROTIC HEART DISEASE OF NATIVE CORONARY ARTERY WITHOUT ANGINA PECTORIS: ICD-10-CM

## 2021-08-09 DIAGNOSIS — R63.0 ANOREXIA: ICD-10-CM

## 2021-08-09 DIAGNOSIS — E78.5 HYPERLIPIDEMIA, UNSPECIFIED: ICD-10-CM

## 2021-08-09 DIAGNOSIS — A41.9 SEPSIS, UNSPECIFIED ORGANISM: ICD-10-CM

## 2021-08-09 DIAGNOSIS — E83.42 HYPOMAGNESEMIA: ICD-10-CM

## 2021-08-09 DIAGNOSIS — E66.01 MORBID (SEVERE) OBESITY DUE TO EXCESS CALORIES: ICD-10-CM

## 2021-08-09 DIAGNOSIS — R10.9 UNSPECIFIED ABDOMINAL PAIN: ICD-10-CM

## 2021-08-09 DIAGNOSIS — Z87.891 PERSONAL HISTORY OF NICOTINE DEPENDENCE: ICD-10-CM

## 2021-08-09 DIAGNOSIS — Z98.84 BARIATRIC SURGERY STATUS: ICD-10-CM

## 2021-08-09 DIAGNOSIS — Z88.2 ALLERGY STATUS TO SULFONAMIDES: ICD-10-CM

## 2021-08-09 DIAGNOSIS — N40.0 BENIGN PROSTATIC HYPERPLASIA WITHOUT LOWER URINARY TRACT SYMPTOMS: ICD-10-CM

## 2021-08-09 DIAGNOSIS — I12.9 HYPERTENSIVE CHRONIC KIDNEY DISEASE WITH STAGE 1 THROUGH STAGE 4 CHRONIC KIDNEY DISEASE, OR UNSPECIFIED CHRONIC KIDNEY DISEASE: ICD-10-CM
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
## 2021-08-09 DIAGNOSIS — K82.A1 GANGRENE OF GALLBLADDER IN CHOLECYSTITIS: ICD-10-CM

## 2021-08-09 DIAGNOSIS — J44.9 CHRONIC OBSTRUCTIVE PULMONARY DISEASE, UNSPECIFIED: ICD-10-CM

## 2021-08-09 DIAGNOSIS — Z92.22 PERSONAL HISTORY OF MONOCLONAL DRUG THERAPY: ICD-10-CM

## 2021-08-09 DIAGNOSIS — N18.30 CHRONIC KIDNEY DISEASE, STAGE 3 UNSPECIFIED: ICD-10-CM

## 2021-08-16 ENCOUNTER — NON-APPOINTMENT (OUTPATIENT)
Age: 71
End: 2021-08-16

## 2021-08-17 LAB
25(OH)D3 SERPL-MCNC: 77 NG/ML
ALBUMIN SERPL ELPH-MCNC: 4 G/DL
ALP BLD-CCNC: 95 U/L
ALT SERPL-CCNC: 30 U/L
ANION GAP SERPL CALC-SCNC: 14 MMOL/L
AST SERPL-CCNC: 18 U/L
BASOPHILS # BLD AUTO: 0.07 K/UL
BASOPHILS NFR BLD AUTO: 0.6 %
BILIRUB SERPL-MCNC: 0.7 MG/DL
BUN SERPL-MCNC: 18 MG/DL
CALCIUM SERPL-MCNC: 9.2 MG/DL
CHLORIDE SERPL-SCNC: 99 MMOL/L
CHOLEST SERPL-MCNC: 185 MG/DL
CO2 SERPL-SCNC: 25 MMOL/L
CREAT SERPL-MCNC: 1.1 MG/DL
EOSINOPHIL # BLD AUTO: 0.2 K/UL
EOSINOPHIL NFR BLD AUTO: 1.7 %
ESTIMATED AVERAGE GLUCOSE: 203 MG/DL
GLUCOSE SERPL-MCNC: 251 MG/DL
HBA1C MFR BLD HPLC: 8.7 %
HCT VFR BLD CALC: 36.3 %
HDLC SERPL-MCNC: 40 MG/DL
HGB BLD-MCNC: 11.7 G/DL
IMM GRANULOCYTES NFR BLD AUTO: 0.3 %
IRON SERPL-MCNC: 75 UG/DL
LDLC SERPL CALC-MCNC: 103 MG/DL
LYMPHOCYTES # BLD AUTO: 5.56 K/UL
LYMPHOCYTES NFR BLD AUTO: 48.1 %
MAGNESIUM SERPL-MCNC: 1.9 MG/DL
MAN DIFF?: NORMAL
MCHC RBC-ENTMCNC: 30.4 PG
MCHC RBC-ENTMCNC: 32.2 G/DL
MCV RBC AUTO: 94.3 FL
MONOCYTES # BLD AUTO: 0.68 K/UL
MONOCYTES NFR BLD AUTO: 5.9 %
NEUTROPHILS # BLD AUTO: 5.03 K/UL
NEUTROPHILS NFR BLD AUTO: 43.4 %
NONHDLC SERPL-MCNC: 145 MG/DL
PLATELET # BLD AUTO: 532 K/UL
POTASSIUM SERPL-SCNC: 4.9 MMOL/L
PREALB SERPL NEPH-MCNC: 26 MG/DL
PROT SERPL-MCNC: 6.9 G/DL
RBC # BLD: 3.85 M/UL
RBC # FLD: 12.9 %
SODIUM SERPL-SCNC: 138 MMOL/L
T3FREE SERPL-MCNC: 2.81 PG/ML
T4 FREE SERPL-MCNC: 1.3 NG/DL
TRIGL SERPL-MCNC: 224 MG/DL
TSH SERPL-ACNC: 3.46 UIU/ML
VIT A SERPL-MCNC: 81.8 UG/DL
VIT B12 SERPL-MCNC: 760 PG/ML
VIT C SERPL-MCNC: 0.6 MG/DL
WBC # FLD AUTO: 11.57 K/UL
ZINC SERPL-MCNC: 74 UG/DL

## 2021-08-19 LAB — VIT B1 SERPL-MCNC: 147.2 NMOL/L

## 2021-08-20 ENCOUNTER — APPOINTMENT (OUTPATIENT)
Dept: SURGERY | Facility: CLINIC | Age: 71
End: 2021-08-20
Payer: MEDICARE

## 2021-08-20 VITALS
HEART RATE: 89 BPM | OXYGEN SATURATION: 98 % | HEIGHT: 65.5 IN | DIASTOLIC BLOOD PRESSURE: 80 MMHG | BODY MASS INDEX: 31.6 KG/M2 | TEMPERATURE: 97.2 F | SYSTOLIC BLOOD PRESSURE: 140 MMHG | WEIGHT: 192 LBS

## 2021-08-20 PROCEDURE — 99024 POSTOP FOLLOW-UP VISIT: CPT

## 2021-08-20 NOTE — HISTORY OF PRESENT ILLNESS
[de-identified] : Mr. Gardner is here today for follow up s/p laparoscopic cholecystectomy approximately 3 weeks ago.  He says he feels good. He is eating a normal diet, having regular bowel movements, no pain. His PMD started him on Metformin after reviewing his labs with elevated HA1C.

## 2021-08-20 NOTE — PHYSICAL EXAM
[de-identified] : A&OX3, NAD [de-identified] : anicteric sclera [de-identified] : soft, NT, ND, incisions well healed

## 2021-08-20 NOTE — PLAN
[FreeTextEntry1] : Doing well postoperatively\par F/u with me in 6 months for bariatric surgery f/u\par F/u with PMD for elevated HA1C\par Call with any questions/concerns

## 2021-08-20 NOTE — ASSESSMENT
[FreeTextEntry1] : 70 y/o male with PSH of gastric bypass now s/p laparoscopic cholecystectomy, doing well.

## 2021-12-31 NOTE — ED ADULT NURSE NOTE - CAS DISCH BELONGINGS RETURNED
Date of Service: 12/30/2021    REFERRING PHYSICIAN:  Shantelle Cano DO. PRIMARY PHYSICIAN:  Bj Martinez DO. PROCEDURE:  Bilateral lumbar facet denervation performed at the lower 3 lumbar levels (L3-L4, L4-L5 and L5-S1). These are performed under fluoroscopic guidance. PREPROCEDURE DIAGNOSES:  1. Lumbar spondylosis. 2.  Lumbosacral spondylosis. 3.  Primarily chronic low back pain. The patient is a very pleasant female seen on previous occasions for transforaminal injections. This has not resulted in improvement in her radicular pain. Her main component of pain at this point in time is her low back pain. We have elected to proceed with lumbar facet denervation via diagnostic medial branch blocks. If she experiences significant short-term pain relief, we would consider lumbar radiofrequency ablation at the above mentioned levels. Today's procedure, along with the risks and possible complications, were fully discussed and reviewed. She consents to the procedure. The patient was brought to our Fluoroscopy Suite, placed in the prone position. Sterile prep and drape were performed. Our 25-gauge needle was placed under fluoroscopic guidance. Needle was placed just below the junction of the superior articular process and transverse process at the L3, L4 and L5 vertebral body levels as well as at the sacral ala. AP, lateral and oblique views revealed excellent needle placement at each site. Each area was then infiltrated with 0.5 mL of 0.375% Bupivacaine. The patient tolerated this extremely well. This resulted in medial branch blocks of the L2, L3, L4 and L5 medial branch nerves bilaterally. This subsequently resulted in denervation of the L3-L4, L4-L5 and L5-S1 facet joints bilaterally. The patient tolerated this well. She was observed and subsequently discharged to home. She experienced significant improvement in her back pain within 15 minutes.   She will keep a pain journal over the next several hours and report her response tomorrow. We will then consider future options. Thank you for allowing me to participate in this pleasant patient's care.       Dictated By: Gurvinder Waters MD  Signing Provider: Gurvinder Waters MD    RL/claudette ([id number])  DD: 12/30/2021 14:40:43 TD: 12/30/2021 21:45:41    Copy Sent To: Discharged Not applicable

## 2022-02-21 NOTE — PRE-OP CHECKLIST - AS TEMP SITE
oral Island Pedicle Flap Text: The defect edges were debeveled with a #15 scalpel blade.  Given the location of the defect, shape of the defect and the proximity to free margins an island pedicle advancement flap was deemed most appropriate.  Using a sterile surgical marker, an appropriate advancement flap was drawn incorporating the defect, outlining the appropriate donor tissue and placing the expected incisions within the relaxed skin tension lines where possible.    The area thus outlined was incised deep to adipose tissue with a #15 scalpel blade.  The skin margins were undermined to an appropriate distance in all directions around the primary defect and laterally outward around the island pedicle utilizing iris scissors.  There was minimal undermining beneath the pedicle flap.

## 2022-02-23 ENCOUNTER — APPOINTMENT (OUTPATIENT)
Dept: SURGERY | Facility: CLINIC | Age: 72
End: 2022-02-23

## 2022-06-24 ENCOUNTER — APPOINTMENT (OUTPATIENT)
Dept: SURGERY | Facility: CLINIC | Age: 72
End: 2022-06-24
Payer: MEDICARE

## 2022-06-24 VITALS
WEIGHT: 178 LBS | DIASTOLIC BLOOD PRESSURE: 80 MMHG | HEIGHT: 65.5 IN | BODY MASS INDEX: 29.3 KG/M2 | SYSTOLIC BLOOD PRESSURE: 122 MMHG | OXYGEN SATURATION: 98 % | HEART RATE: 59 BPM

## 2022-06-24 PROCEDURE — 99213 OFFICE O/P EST LOW 20 MIN: CPT

## 2022-06-24 RX ORDER — OMEPRAZOLE 20 MG/1
20 TABLET, DELAYED RELEASE ORAL DAILY
Refills: 0 | Status: DISCONTINUED | COMMUNITY
End: 2022-06-24

## 2022-06-24 RX ORDER — SIMVASTATIN 80 MG/1
TABLET, FILM COATED ORAL
Refills: 0 | Status: DISCONTINUED | COMMUNITY
End: 2022-06-24

## 2022-06-24 RX ORDER — VALSARTAN 160 MG/1
160 TABLET ORAL
Refills: 0 | Status: DISCONTINUED | COMMUNITY
End: 2022-06-24

## 2022-06-24 RX ORDER — METFORMIN HYDROCHLORIDE 500 MG/1
500 TABLET, COATED ORAL
Refills: 0 | Status: DISCONTINUED | COMMUNITY
End: 2022-06-24

## 2022-06-24 NOTE — REASON FOR VISIT
[Follow-Up Visit] : a follow-up visit for [S/P Bariatric Surgery] : s/p bariatric surgery [FreeTextEntry2] : g

## 2022-06-24 NOTE — ASSESSMENT
[FreeTextEntry1] : 71 y/o male with PSH of gastric bypass and cholecystectomy 1 year ago here for follow up.

## 2022-06-24 NOTE — HISTORY OF PRESENT ILLNESS
Metastatic adenoCA, as seen on path report from specimen sent to path on 7/31  H/o rectal CA  See frontal mass   [de-identified] : Mr. Gardner had a gastric bypass several years ago and I performed a laparoscopic cholecystectomy on him last August.  He is here today for follow up.  He reports that last week he had an episode of sudden, sharp, lower abdominal pain that lasted a few hours.  There was no associated nausea or vomiting.  He has been having normal, nonbloody bowel movements.  He was able to go to work despite the pain and the discomfort eventually subsided, however he said for the past week he is aware of a lower abdominal discomfort if he coughs or strains.  He also reports a recent 14 pound weight loss that was somewhat unintentional.  He said he did start watching his carbohydrate intake a few months ago and lost some weight but then he started eating normally and the weight kept coming off.  He also gets full faster than he did before.  Today he denies any abdominal pain, nausea, vomiting, changes in bowel function.\par \par He saw his cardiologist recently for tachycardia and was put on Metoprolol after an event monitor demonstrated a rapid heart rate.  He has an appointment to see an electrophysiologist coming up. \par \par He has not had a colonoscopy in several years.  He has not had bloodwork done in several months but is seeing his PMD, Dr. Campos, next week.

## 2022-06-24 NOTE — PLAN
[FreeTextEntry1] : Will order complete set of blood work including iron panel, TFTs, and vitamins.\par F/u with cardiology for tachycardia\par F/u with GI for colonoscopy\par F.u with PMD\par I told him that if his abdominal pain recurs, he should call us and we can order a CT scan of his abdomen but given that he no longer experiencing abdominal discomfort and the episode was short lived, I do not see a reason to order one at this time unless he continues to lose weight unintentionally and his bw and colonoscopy do not reveal a cause.

## 2022-06-24 NOTE — PHYSICAL EXAM
[Normal] : normoactive bowel sounds, soft and nontender, no hepatosplenomegaly or masses appreciated. Incisions healing appropriately without erythema or drainage [de-identified] : no hernias palpated

## 2022-07-08 ENCOUNTER — APPOINTMENT (OUTPATIENT)
Dept: CARDIOLOGY | Facility: CLINIC | Age: 72
End: 2022-07-08

## 2022-07-08 VITALS
SYSTOLIC BLOOD PRESSURE: 134 MMHG | BODY MASS INDEX: 26.68 KG/M2 | WEIGHT: 170 LBS | HEIGHT: 67 IN | DIASTOLIC BLOOD PRESSURE: 75 MMHG | TEMPERATURE: 97.3 F

## 2022-07-08 VITALS — HEART RATE: 85 BPM

## 2022-07-08 DIAGNOSIS — R00.2 PALPITATIONS: ICD-10-CM

## 2022-07-08 DIAGNOSIS — I10 ESSENTIAL (PRIMARY) HYPERTENSION: ICD-10-CM

## 2022-07-08 DIAGNOSIS — I49.3 VENTRICULAR PREMATURE DEPOLARIZATION: ICD-10-CM

## 2022-07-08 DIAGNOSIS — I47.1 SUPRAVENTRICULAR TACHYCARDIA: ICD-10-CM

## 2022-07-08 DIAGNOSIS — N40.0 BENIGN PROSTATIC HYPERPLASIA WITHOUT LOWER URINARY TRACT SYMPMS: ICD-10-CM

## 2022-07-08 DIAGNOSIS — E78.00 PURE HYPERCHOLESTEROLEMIA, UNSPECIFIED: ICD-10-CM

## 2022-07-08 PROCEDURE — 99215 OFFICE O/P EST HI 40 MIN: CPT

## 2022-07-08 PROCEDURE — 93000 ELECTROCARDIOGRAM COMPLETE: CPT

## 2022-07-08 RX ORDER — PEDI MULTIVIT NO.25/FOLIC ACID 300 MCG
60 TABLET,CHEWABLE ORAL DAILY
Refills: 0 | Status: ACTIVE | COMMUNITY

## 2022-07-08 RX ORDER — ASPIRIN 81 MG
81 TABLET, DELAYED RELEASE (ENTERIC COATED) ORAL DAILY
Refills: 0 | Status: ACTIVE | COMMUNITY

## 2022-07-08 RX ORDER — ERGOCALCIFEROL 1.25 MG/1
1.25 MG CAPSULE, LIQUID FILLED ORAL
Qty: 12 | Refills: 0 | Status: ACTIVE | COMMUNITY
Start: 2022-01-28

## 2022-07-08 RX ORDER — CYANOCOBALAMIN (VITAMIN B-12) 500 MCG
500 TABLET ORAL WEEKLY
Refills: 0 | Status: ACTIVE | COMMUNITY

## 2022-07-08 RX ORDER — TAMSULOSIN HYDROCHLORIDE 0.4 MG/1
0.4 CAPSULE ORAL
Refills: 0 | Status: ACTIVE | COMMUNITY

## 2022-07-18 ENCOUNTER — OUTPATIENT (OUTPATIENT)
Dept: OUTPATIENT SERVICES | Facility: HOSPITAL | Age: 72
LOS: 1 days | Discharge: HOME | End: 2022-07-18

## 2022-07-18 VITALS
DIASTOLIC BLOOD PRESSURE: 66 MMHG | HEART RATE: 66 BPM | OXYGEN SATURATION: 98 % | HEIGHT: 67 IN | SYSTOLIC BLOOD PRESSURE: 119 MMHG | RESPIRATION RATE: 14 BRPM | TEMPERATURE: 97 F | WEIGHT: 173.06 LBS

## 2022-07-18 DIAGNOSIS — Z01.818 ENCOUNTER FOR OTHER PREPROCEDURAL EXAMINATION: ICD-10-CM

## 2022-07-18 DIAGNOSIS — Z90.49 ACQUIRED ABSENCE OF OTHER SPECIFIED PARTS OF DIGESTIVE TRACT: Chronic | ICD-10-CM

## 2022-07-18 DIAGNOSIS — Z98.890 OTHER SPECIFIED POSTPROCEDURAL STATES: Chronic | ICD-10-CM

## 2022-07-18 DIAGNOSIS — Z98.84 BARIATRIC SURGERY STATUS: Chronic | ICD-10-CM

## 2022-07-18 DIAGNOSIS — I47.1 SUPRAVENTRICULAR TACHYCARDIA: ICD-10-CM

## 2022-07-18 LAB
ALBUMIN SERPL ELPH-MCNC: 4.9 G/DL — SIGNIFICANT CHANGE UP (ref 3.5–5.2)
ALP SERPL-CCNC: 75 U/L — SIGNIFICANT CHANGE UP (ref 30–115)
ALT FLD-CCNC: 20 U/L — SIGNIFICANT CHANGE UP (ref 0–41)
ANION GAP SERPL CALC-SCNC: 14 MMOL/L — SIGNIFICANT CHANGE UP (ref 7–14)
APTT BLD: 31.9 SEC — SIGNIFICANT CHANGE UP (ref 27–39.2)
AST SERPL-CCNC: 20 U/L — SIGNIFICANT CHANGE UP (ref 0–41)
BASOPHILS # BLD AUTO: 0.04 K/UL — SIGNIFICANT CHANGE UP (ref 0–0.2)
BASOPHILS NFR BLD AUTO: 0.5 % — SIGNIFICANT CHANGE UP (ref 0–1)
BILIRUB SERPL-MCNC: 0.6 MG/DL — SIGNIFICANT CHANGE UP (ref 0.2–1.2)
BUN SERPL-MCNC: 36 MG/DL — HIGH (ref 10–20)
CALCIUM SERPL-MCNC: 9.4 MG/DL — SIGNIFICANT CHANGE UP (ref 8.5–10.1)
CHLORIDE SERPL-SCNC: 95 MMOL/L — LOW (ref 98–110)
CO2 SERPL-SCNC: 27 MMOL/L — SIGNIFICANT CHANGE UP (ref 17–32)
CREAT SERPL-MCNC: 1.6 MG/DL — HIGH (ref 0.7–1.5)
EGFR: 45 ML/MIN/1.73M2 — LOW
EOSINOPHIL # BLD AUTO: 0.29 K/UL — SIGNIFICANT CHANGE UP (ref 0–0.7)
EOSINOPHIL NFR BLD AUTO: 3.5 % — SIGNIFICANT CHANGE UP (ref 0–8)
GLUCOSE SERPL-MCNC: 134 MG/DL — HIGH (ref 70–99)
HCT VFR BLD CALC: 38.8 % — LOW (ref 42–52)
HGB BLD-MCNC: 13.7 G/DL — LOW (ref 14–18)
IMM GRANULOCYTES NFR BLD AUTO: 0.1 % — SIGNIFICANT CHANGE UP (ref 0.1–0.3)
INR BLD: 0.87 RATIO — SIGNIFICANT CHANGE UP (ref 0.65–1.3)
LYMPHOCYTES # BLD AUTO: 3.16 K/UL — SIGNIFICANT CHANGE UP (ref 1.2–3.4)
LYMPHOCYTES # BLD AUTO: 38.2 % — SIGNIFICANT CHANGE UP (ref 20.5–51.1)
MCHC RBC-ENTMCNC: 31.7 PG — HIGH (ref 27–31)
MCHC RBC-ENTMCNC: 35.3 G/DL — SIGNIFICANT CHANGE UP (ref 32–37)
MCV RBC AUTO: 89.8 FL — SIGNIFICANT CHANGE UP (ref 80–94)
MONOCYTES # BLD AUTO: 0.65 K/UL — HIGH (ref 0.1–0.6)
MONOCYTES NFR BLD AUTO: 7.9 % — SIGNIFICANT CHANGE UP (ref 1.7–9.3)
NEUTROPHILS # BLD AUTO: 4.12 K/UL — SIGNIFICANT CHANGE UP (ref 1.4–6.5)
NEUTROPHILS NFR BLD AUTO: 49.8 % — SIGNIFICANT CHANGE UP (ref 42.2–75.2)
NRBC # BLD: 0 /100 WBCS — SIGNIFICANT CHANGE UP (ref 0–0)
PLATELET # BLD AUTO: 179 K/UL — SIGNIFICANT CHANGE UP (ref 130–400)
POTASSIUM SERPL-MCNC: 4.3 MMOL/L — SIGNIFICANT CHANGE UP (ref 3.5–5)
POTASSIUM SERPL-SCNC: 4.3 MMOL/L — SIGNIFICANT CHANGE UP (ref 3.5–5)
PROT SERPL-MCNC: 7.2 G/DL — SIGNIFICANT CHANGE UP (ref 6–8)
PROTHROM AB SERPL-ACNC: 10 SEC — SIGNIFICANT CHANGE UP (ref 9.95–12.87)
RBC # BLD: 4.32 M/UL — LOW (ref 4.7–6.1)
RBC # FLD: 13.2 % — SIGNIFICANT CHANGE UP (ref 11.5–14.5)
SODIUM SERPL-SCNC: 136 MMOL/L — SIGNIFICANT CHANGE UP (ref 135–146)
WBC # BLD: 8.27 K/UL — SIGNIFICANT CHANGE UP (ref 4.8–10.8)
WBC # FLD AUTO: 8.27 K/UL — SIGNIFICANT CHANGE UP (ref 4.8–10.8)

## 2022-07-18 PROCEDURE — 93010 ELECTROCARDIOGRAM REPORT: CPT

## 2022-07-18 RX ORDER — SITAGLIPTIN 50 MG/1
1 TABLET, FILM COATED ORAL
Qty: 0 | Refills: 0 | DISCHARGE

## 2022-07-18 NOTE — H&P PST ADULT - NSANTHOSAYNRD_GEN_A_CORE
No. EMERITA screening performed.  STOP BANG Legend: 0-2 = LOW Risk; 3-4 = INTERMEDIATE Risk; 5-8 = HIGH Risk

## 2022-07-18 NOTE — H&P PST ADULT - HISTORY OF PRESENT ILLNESS
71 yo male  presents for PAST in preparation for SVT ablation   Pt complains of " racing bond" for one hour x 3 . Last episode was about 3-4 weeks ago. Also feels lightheaded.  Currently denies any chest pain, difficulty breathing, SOB, palpitations, dysuria, URI, or any other infections in the last 2 weeks/1 month. Denies any recent travel, contact, or exposure to any persons with known or suspected COVID-19. Pt also denies COVID testing within the last 2 weeks. Pt advised to self quarantine until day of procedure. Exercise tolerance of 2-3 flights of stairs without dyspnea. EMERITA reviewed with patient. Pt is still working and is very active states "i am on my feel all day long"   Anesthesia Alert  NO--Difficult Airway  NO--History of neck surgery or radiation  NO--Limited ROM of neck  NO--History of Malignant hyperthermia  NO--Personal or family history of Pseudocholinesterase deficiency.  NO--Prior Anesthesia Complication  NO--Latex Allergy  NO--Loose teeth  NO--History of Rheumatoid Arthritis  NO--EMERITA  NO--Bleeding risk, ASA 81   NO--Other_____   written and verbal instructions with teach back on chlorhexidine shampoo provided,  pt verbalized understanding with returned demonstration  Patient verbalized understanding of instructions and was given the opportunity to ask questions and have them answered.

## 2022-07-18 NOTE — H&P PST ADULT - REASON FOR ADMISSION
Case Type: OP Block TimeSuite: EP LabProceduralist: Bala Rodriguez  Confirmed Surgery DateTime: 07- - 11:00  Procedure: SVT ABALTION  Laterality: N/ALength of Procedure: 180 Minutes  Anesthesia Type: Local Standby

## 2022-07-18 NOTE — H&P PST ADULT - NSICDXPASTSURGICALHX_GEN_ALL_CORE_FT
PAST SURGICAL HISTORY:  Absence of gallbladder 2021    H/O knee surgery left    S/P gastric bypass 2012

## 2022-07-24 ENCOUNTER — LABORATORY RESULT (OUTPATIENT)
Age: 72
End: 2022-07-24

## 2022-07-27 ENCOUNTER — TRANSCRIPTION ENCOUNTER (OUTPATIENT)
Age: 72
End: 2022-07-27

## 2022-07-27 ENCOUNTER — INPATIENT (INPATIENT)
Facility: HOSPITAL | Age: 72
LOS: 0 days | Discharge: HOME | End: 2022-07-28
Attending: STUDENT IN AN ORGANIZED HEALTH CARE EDUCATION/TRAINING PROGRAM | Admitting: STUDENT IN AN ORGANIZED HEALTH CARE EDUCATION/TRAINING PROGRAM

## 2022-07-27 VITALS — WEIGHT: 176.37 LBS

## 2022-07-27 DIAGNOSIS — R00.1 BRADYCARDIA, UNSPECIFIED: ICD-10-CM

## 2022-07-27 DIAGNOSIS — K21.9 GASTRO-ESOPHAGEAL REFLUX DISEASE WITHOUT ESOPHAGITIS: ICD-10-CM

## 2022-07-27 DIAGNOSIS — I10 ESSENTIAL (PRIMARY) HYPERTENSION: ICD-10-CM

## 2022-07-27 DIAGNOSIS — H91.90 UNSPECIFIED HEARING LOSS, UNSPECIFIED EAR: ICD-10-CM

## 2022-07-27 DIAGNOSIS — Z98.890 OTHER SPECIFIED POSTPROCEDURAL STATES: Chronic | ICD-10-CM

## 2022-07-27 DIAGNOSIS — Z79.84 LONG TERM (CURRENT) USE OF ORAL HYPOGLYCEMIC DRUGS: ICD-10-CM

## 2022-07-27 DIAGNOSIS — N40.0 BENIGN PROSTATIC HYPERPLASIA WITHOUT LOWER URINARY TRACT SYMPTOMS: ICD-10-CM

## 2022-07-27 DIAGNOSIS — K00.0 ANODONTIA: ICD-10-CM

## 2022-07-27 DIAGNOSIS — I47.1 SUPRAVENTRICULAR TACHYCARDIA: ICD-10-CM

## 2022-07-27 DIAGNOSIS — Z88.2 ALLERGY STATUS TO SULFONAMIDES: ICD-10-CM

## 2022-07-27 DIAGNOSIS — Z98.84 BARIATRIC SURGERY STATUS: ICD-10-CM

## 2022-07-27 DIAGNOSIS — Z98.84 BARIATRIC SURGERY STATUS: Chronic | ICD-10-CM

## 2022-07-27 DIAGNOSIS — E78.5 HYPERLIPIDEMIA, UNSPECIFIED: ICD-10-CM

## 2022-07-27 DIAGNOSIS — E11.9 TYPE 2 DIABETES MELLITUS WITHOUT COMPLICATIONS: ICD-10-CM

## 2022-07-27 DIAGNOSIS — Z79.82 LONG TERM (CURRENT) USE OF ASPIRIN: ICD-10-CM

## 2022-07-27 DIAGNOSIS — I49.3 VENTRICULAR PREMATURE DEPOLARIZATION: ICD-10-CM

## 2022-07-27 DIAGNOSIS — Z97.2 PRESENCE OF DENTAL PROSTHETIC DEVICE (COMPLETE) (PARTIAL): ICD-10-CM

## 2022-07-27 DIAGNOSIS — Z90.49 ACQUIRED ABSENCE OF OTHER SPECIFIED PARTS OF DIGESTIVE TRACT: Chronic | ICD-10-CM

## 2022-07-27 LAB
GLUCOSE BLDC GLUCOMTR-MCNC: 126 MG/DL — HIGH (ref 70–99)
GLUCOSE BLDC GLUCOMTR-MCNC: 157 MG/DL — HIGH (ref 70–99)
GLUCOSE BLDC GLUCOMTR-MCNC: 177 MG/DL — HIGH (ref 70–99)
GLUCOSE BLDC GLUCOMTR-MCNC: 178 MG/DL — HIGH (ref 70–99)

## 2022-07-27 PROCEDURE — 93653 COMPRE EP EVAL TX SVT: CPT

## 2022-07-27 PROCEDURE — 93623 PRGRMD STIMJ&PACG IV RX NFS: CPT | Mod: 26

## 2022-07-27 RX ORDER — SODIUM CHLORIDE 9 MG/ML
1000 INJECTION, SOLUTION INTRAVENOUS
Refills: 0 | Status: DISCONTINUED | OUTPATIENT
Start: 2022-07-27 | End: 2022-07-28

## 2022-07-27 RX ORDER — HYDROCHLOROTHIAZIDE 25 MG
12.5 TABLET ORAL DAILY
Refills: 0 | Status: DISCONTINUED | OUTPATIENT
Start: 2022-07-27 | End: 2022-07-28

## 2022-07-27 RX ORDER — GLUCAGON INJECTION, SOLUTION 0.5 MG/.1ML
1 INJECTION, SOLUTION SUBCUTANEOUS ONCE
Refills: 0 | Status: DISCONTINUED | OUTPATIENT
Start: 2022-07-27 | End: 2022-07-28

## 2022-07-27 RX ORDER — DEXTROSE 50 % IN WATER 50 %
15 SYRINGE (ML) INTRAVENOUS ONCE
Refills: 0 | Status: DISCONTINUED | OUTPATIENT
Start: 2022-07-27 | End: 2022-07-28

## 2022-07-27 RX ORDER — DEXTROSE 50 % IN WATER 50 %
25 SYRINGE (ML) INTRAVENOUS ONCE
Refills: 0 | Status: DISCONTINUED | OUTPATIENT
Start: 2022-07-27 | End: 2022-07-28

## 2022-07-27 RX ORDER — DEXTROSE 50 % IN WATER 50 %
12.5 SYRINGE (ML) INTRAVENOUS ONCE
Refills: 0 | Status: DISCONTINUED | OUTPATIENT
Start: 2022-07-27 | End: 2022-07-28

## 2022-07-27 RX ORDER — TAMSULOSIN HYDROCHLORIDE 0.4 MG/1
0.4 CAPSULE ORAL AT BEDTIME
Refills: 0 | Status: DISCONTINUED | OUTPATIENT
Start: 2022-07-27 | End: 2022-07-28

## 2022-07-27 RX ORDER — INSULIN LISPRO 100/ML
VIAL (ML) SUBCUTANEOUS AT BEDTIME
Refills: 0 | Status: DISCONTINUED | OUTPATIENT
Start: 2022-07-27 | End: 2022-07-28

## 2022-07-27 RX ORDER — SIMVASTATIN 20 MG/1
20 TABLET, FILM COATED ORAL AT BEDTIME
Refills: 0 | Status: DISCONTINUED | OUTPATIENT
Start: 2022-07-27 | End: 2022-07-28

## 2022-07-27 RX ORDER — VALSARTAN 80 MG/1
160 TABLET ORAL DAILY
Refills: 0 | Status: DISCONTINUED | OUTPATIENT
Start: 2022-07-27 | End: 2022-07-28

## 2022-07-27 RX ORDER — ASPIRIN/CALCIUM CARB/MAGNESIUM 324 MG
81 TABLET ORAL DAILY
Refills: 0 | Status: DISCONTINUED | OUTPATIENT
Start: 2022-07-27 | End: 2022-07-28

## 2022-07-27 RX ADMIN — TAMSULOSIN HYDROCHLORIDE 0.4 MILLIGRAM(S): 0.4 CAPSULE ORAL at 21:53

## 2022-07-27 RX ADMIN — SIMVASTATIN 20 MILLIGRAM(S): 20 TABLET, FILM COATED ORAL at 21:54

## 2022-07-27 NOTE — DISCHARGE NOTE PROVIDER - PROVIDER TOKENS
PROVIDER:[TOKEN:[[id number]],FOLLOWUP:[1 month],ESTABLISHEDPATIENT:[T]],PROVIDER:[TOKEN:[[id number]],SCHEDULEDAPPT:[09/27/2022],SCHEDULEDAPPTTIME:[02:30 PM],ESTABLISHEDPATIENT:[T]]

## 2022-07-27 NOTE — DISCHARGE NOTE PROVIDER - NSDCFUSCHEDAPPT_GEN_ALL_CORE_FT
Bala Rodriguez Physician [facility]  CARDIOLOGY [address] [place] [address]  Scheduled Appointment: 09/27/2022

## 2022-07-27 NOTE — DISCHARGE NOTE PROVIDER - HOSPITAL COURSE
73 y/o male with HTN and HLD with c/o multiple episodes of "racing heart" and dizziness now s/p SVT ablation.  Findings below.  Bilateral groin access sites C/D/I.  EKG reviewed.  Stable for discharge to home.      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 Gardner is a 72yoM patient of Dr. Banerjee with HTN, HLD, NIDDM, and SVT (multiple episodes of "racing heart") who presented for elective SVT ablation. Patient underwent successful AVNRT ablation with Dr. Rodriguez on 7/28/22.    Bilateral femoral venous access sites are clean, dry, and intact with no hematomas or tenderness. On telemetry, patient in sinus rhythm with HR range 44-82 bpm (did not take home Toprol on the day of the procedure) with episodes of ventricular bigeminy. ECG confirms sinus rhythm with PVCs. Patient's home Toprol was reduced to 25 mg daily given his sinus bradycardia without taking his beta blocker. Otherwise, no changes to home medications.

## 2022-07-27 NOTE — DISCHARGE NOTE PROVIDER - ATTENDING DISCHARGE PHYSICAL EXAMINATION:
Patient seen and examined prior to discharge.  Agree with summary and plan as above.  At time of discharge, groin sites were evaluated and noted to be clean, dry, and intact bilaterally.  Distal pulses were 2+, equal. No LE edema. Cardiac and pulmonary exams were unremarkable.  Patient will follow up with Dr. Rodriguez and Dr. Banerjee as above.

## 2022-07-27 NOTE — CHART NOTE - NSCHARTNOTEFT_GEN_A_CORE
Electrophysiology Brief Post-Op Note      I have personally seen and examined the patient.  I agree with the history and physical which I have reviewed and noted any changes below.      PRE-OP DIAGNOSIS: SVT    POST-OP DIAGNOSIS: SVT    PROCEDURE:  -Electrophysiology study with induction of arrhythmias  -Ablation of supraventricular arrhythmia  -3D mapping  -Infusion of medicine    Vascular Access used (using Ultrasound Guidance and micropuncture needle)  -Right Femoral Vein: 8F  8F  -Left Femoral Vein: 6F 6F 6F  -Right Femoral Artery: none    All sheaths and wires removed and manual pressure applied.    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
I saw and examined patient and I reviewed his chart and blood work. I attest that there has been no clinical change in patient's condition since last assessment documented in H&P, consult, or last office visit.

## 2022-07-27 NOTE — DISCHARGE NOTE PROVIDER - NSDCCPTREATMENT_GEN_ALL_CORE_FT
PRINCIPAL PROCEDURE  Procedure: Catheter ablation, cardiac, for SVT  Findings and Treatment:   - Do NOT stop blood thinners unless discussed with doctor  - No heavy lifting > 10 lbs, squatting, or exertional activities 1-2 weeks  - Can take a shower starting today  - No submerging in water for 1 week  - No driving for 3 days         PRINCIPAL PROCEDURE  Procedure: Catheter ablation, cardiac, for SVT  Findings and Treatment: - You may shower today.  - Do not drive or operate heavy machinery for 3 days.  - Do not submerge in water (example: baths, swimming) for 1 week.  - No squatting, exertional activities, or lifting anything > 10 lbs for 1 week.  - Any sudden swelling, redness, fever, discharge, or severe pain, call the Electrophysiology Office at [phone].

## 2022-07-27 NOTE — PATIENT PROFILE ADULT - FALL HARM RISK - HARM RISK INTERVENTIONS

## 2022-07-27 NOTE — DISCHARGE NOTE PROVIDER - CARE PROVIDER_API CALL
Anna Banerjee)  Cardiology; Interventional Cardiology  [address] [place] [address]  Phone: [phone]  Fax: [phone]  Established Patient  Follow Up Time: 1 month    Bala Rodriguez)  Cardiac Electrophysiology; Cardiovascular Disease; [facility]  [address] [place] [address]  Phone: [phone]  Fax: [phone]  Established Patient  Scheduled Appointment: 09/27/2022 02:30 PM

## 2022-07-27 NOTE — DISCHARGE NOTE PROVIDER - NSDCMRMEDTOKEN_GEN_ALL_CORE_FT
aspirin 81 mg oral tablet: 1 tab(s) orally once a day  Januvia 25 mg oral tablet: 2 tab(s) orally once a day  simvastatin 20 mg oral tablet: 1 tab(s) orally once a day (at bedtime)  tamsulosin 0.4 mg oral capsule: 1 cap(s) orally once a day  valsartan-hydrochlorothiazide 160 mg-12.5 mg oral tablet: 1 tab(s) orally once a day   amLODIPine 10 mg oral tablet: 1 tab(s) orally once a day  aspirin 81 mg oral tablet: 1 tab(s) orally once a day  atorvastatin 20 mg oral tablet: 1 tab(s) orally once a day  Januvia 25 mg oral tablet: 2 tab(s) orally once a day  tamsulosin 0.4 mg oral capsule: 1 cap(s) orally once a day  Toprol-XL 25 mg oral tablet, extended release: 1 tab(s) orally once a day   valsartan-hydrochlorothiazide 160 mg-12.5 mg oral tablet: 1 tab(s) orally once a day

## 2022-07-28 ENCOUNTER — TRANSCRIPTION ENCOUNTER (OUTPATIENT)
Age: 72
End: 2022-07-28

## 2022-07-28 VITALS
HEART RATE: 53 BPM | TEMPERATURE: 98 F | RESPIRATION RATE: 18 BRPM | DIASTOLIC BLOOD PRESSURE: 77 MMHG | SYSTOLIC BLOOD PRESSURE: 168 MMHG

## 2022-07-28 DIAGNOSIS — I10 ESSENTIAL (PRIMARY) HYPERTENSION: ICD-10-CM

## 2022-07-28 DIAGNOSIS — E11.9 TYPE 2 DIABETES MELLITUS WITHOUT COMPLICATIONS: ICD-10-CM

## 2022-07-28 DIAGNOSIS — E78.5 HYPERLIPIDEMIA, UNSPECIFIED: ICD-10-CM

## 2022-07-28 LAB
A1C WITH ESTIMATED AVERAGE GLUCOSE RESULT: 7.4 % — HIGH (ref 4–5.6)
ESTIMATED AVERAGE GLUCOSE: 166 MG/DL — HIGH (ref 68–114)
GLUCOSE BLDC GLUCOMTR-MCNC: 144 MG/DL — HIGH (ref 70–99)
GLUCOSE BLDC GLUCOMTR-MCNC: 180 MG/DL — HIGH (ref 70–99)

## 2022-07-28 PROCEDURE — 99232 SBSQ HOSP IP/OBS MODERATE 35: CPT

## 2022-07-28 PROCEDURE — 99238 HOSP IP/OBS DSCHRG MGMT 30/<: CPT

## 2022-07-28 PROCEDURE — 93010 ELECTROCARDIOGRAM REPORT: CPT

## 2022-07-28 RX ORDER — SITAGLIPTIN 50 MG/1
2 TABLET, FILM COATED ORAL
Qty: 0 | Refills: 0 | DISCHARGE

## 2022-07-28 RX ORDER — METOPROLOL TARTRATE 50 MG
1 TABLET ORAL
Qty: 30 | Refills: 0
Start: 2022-07-28 | End: 2022-08-26

## 2022-07-28 RX ORDER — METOPROLOL TARTRATE 50 MG
1 TABLET ORAL
Qty: 0 | Refills: 0 | DISCHARGE

## 2022-07-28 RX ORDER — SIMVASTATIN 20 MG/1
1 TABLET, FILM COATED ORAL
Qty: 0 | Refills: 0 | DISCHARGE

## 2022-07-28 RX ORDER — TAMSULOSIN HYDROCHLORIDE 0.4 MG/1
1 CAPSULE ORAL
Qty: 0 | Refills: 0 | DISCHARGE

## 2022-07-28 RX ORDER — ASPIRIN/CALCIUM CARB/MAGNESIUM 324 MG
1 TABLET ORAL
Qty: 0 | Refills: 0 | DISCHARGE

## 2022-07-28 RX ORDER — AMLODIPINE BESYLATE 2.5 MG/1
1 TABLET ORAL
Qty: 0 | Refills: 0 | DISCHARGE

## 2022-07-28 RX ORDER — ATORVASTATIN CALCIUM 80 MG/1
1 TABLET, FILM COATED ORAL
Qty: 0 | Refills: 0 | DISCHARGE

## 2022-07-28 RX ADMIN — Medication 12.5 MILLIGRAM(S): at 05:46

## 2022-07-28 RX ADMIN — Medication 81 MILLIGRAM(S): at 11:29

## 2022-07-28 RX ADMIN — VALSARTAN 160 MILLIGRAM(S): 80 TABLET ORAL at 05:47

## 2022-07-28 NOTE — DISCHARGE NOTE NURSING/CASE MANAGEMENT/SOCIAL WORK - NSDCPEFALRISK_GEN_ALL_CORE
For information on Fall & Injury Prevention, visit: [email or web address] OR  [email or web address] OR  https://www.cdc.gov/steadi/patient.html

## 2022-07-28 NOTE — PROGRESS NOTE ADULT - ASSESSMENT
A/P  Patient S/P SVT    Ablation  Patient is doing well  - continue Aspirin 81mg for at least 30 days  - decrease Toprol XL 25 mg daily  - No heavy lifting or exertional activities for 5-7days  - Can take a shower, no bathtub for 5days, do not submerge yourself in water  - FU with Dr Rodriguez at Dr Franklin office in 1 month    A/P  Patient S/P SVT ablation  Patient is doing well  - continue Aspirin 81mg for at least 30 days  - decrease Toprol XL 25 mg daily  - No heavy lifting or exertional activities for 5-7days  - Can take a shower, no bathtub for 5days, do not submerge yourself in water  - FU with Dr Rodriguez at Dr Banerjee office in 1 month

## 2022-07-28 NOTE — PROGRESS NOTE ADULT - SUBJECTIVE AND OBJECTIVE BOX
INTERVAL HPI/OVERNIGHT EVENTS:    Patient s/p     SV        ablation.   bradycardic overnight  Patient in NSR    MEDICATIONS  (STANDING):  aspirin  chewable 81 milliGRAM(s) Oral daily  dextrose 5%. 1000 milliLiter(s) (100 mL/Hr) IV Continuous <Continuous>  dextrose 5%. 1000 milliLiter(s) (50 mL/Hr) IV Continuous <Continuous>  dextrose 50% Injectable 25 Gram(s) IV Push once  dextrose 50% Injectable 12.5 Gram(s) IV Push once  dextrose 50% Injectable 25 Gram(s) IV Push once  glucagon  Injectable 1 milliGRAM(s) IntraMuscular once  hydrochlorothiazide 12.5 milliGRAM(s) Oral daily  insulin lispro (ADMELOG) corrective regimen sliding scale   SubCutaneous at bedtime  simvastatin 20 milliGRAM(s) Oral at bedtime  tamsulosin 0.4 milliGRAM(s) Oral at bedtime  valsartan 160 milliGRAM(s) Oral daily    MEDICATIONS  (PRN):  dextrose Oral Gel 15 Gram(s) Oral once PRN Blood Glucose LESS THAN 70 milliGRAM(s)/deciliter      Allergies    sulfa drugs (Hives; Urticaria)    Intolerances          Vital Signs Last 24 Hrs  T(C): 36.4 (28 Jul 2022 05:05), Max: 36.4 (27 Jul 2022 14:58)  T(F): 97.6 (28 Jul 2022 05:05), Max: 97.6 (27 Jul 2022 14:58)  HR: 53 (28 Jul 2022 05:05) (53 - 62)  BP: 151/71 (28 Jul 2022 05:05) (130/61 - 151/71)  BP(mean): --  RR: 18 (28 Jul 2022 05:05) (18 - 18)  SpO2: 97% (27 Jul 2022 14:58) (97% - 97%)    Parameters below as of 27 Jul 2022 14:58  Patient On (Oxygen Delivery Method): room air        GENERAL: In no apparent distress, well nourished, and hydrated.  HEART: Regular rate and rhythm; No murmurs, rubs, or gallops.  PULMONARY: Clear to auscultation and perfusion.  No rales, wheezing, or rhonchi bilaterally.  ABDOMEN: Soft, Nontender, Nondistended; Bowel sounds present  EXTREMITIES:  2+ Peripheral Pulses, No clubbing, cyanosis, or edema  groins No hematoma, no bleeding; stiches removed  NEUROLOGICAL: Grossly nonfocal    LABS:                EKG: NSR @68

## 2022-07-29 PROBLEM — I10 HYPERTENSION, UNSPECIFIED TYPE: Status: ACTIVE | Noted: 2018-05-14

## 2022-07-29 PROBLEM — E78.00 ELEVATED CHOLESTEROL: Status: ACTIVE | Noted: 2018-05-14

## 2022-07-29 PROBLEM — N40.0 BENIGN PROSTATIC HYPERPLASIA, UNSPECIFIED WHETHER LOWER URINARY TRACT SYMPTOMS PRESENT: Status: ACTIVE | Noted: 2018-05-14

## 2022-07-29 RX ORDER — ATORVASTATIN CALCIUM 20 MG/1
20 TABLET, FILM COATED ORAL
Refills: 0 | Status: ACTIVE | COMMUNITY
Start: 2022-03-25

## 2022-07-29 RX ORDER — METOPROLOL SUCCINATE 50 MG/1
50 TABLET, EXTENDED RELEASE ORAL
Refills: 0 | Status: ACTIVE | COMMUNITY
Start: 2022-06-20

## 2022-07-29 RX ORDER — AMLODIPINE BESYLATE 10 MG/1
10 TABLET ORAL DAILY
Refills: 0 | Status: ACTIVE | COMMUNITY

## 2022-07-29 RX ORDER — VALSARTAN AND HYDROCHLOROTHIAZIDE 320; 25 MG/1; MG/1
320-25 TABLET, FILM COATED ORAL
Refills: 0 | Status: ACTIVE | COMMUNITY
Start: 2021-09-14

## 2022-07-29 RX ORDER — SITAGLIPTIN 50 MG/1
50 TABLET, FILM COATED ORAL DAILY
Refills: 0 | Status: ACTIVE | COMMUNITY

## 2022-07-29 NOTE — HISTORY OF PRESENT ILLNESS
[FreeTextEntry1] : Referring Cardiologist: Dr. Anna Banerjee\par \par intermittent palpitations, moderate to severe intensity, sudden onset and offset;\par had Holter monitor with Dr. Banerjee showing SVT to 200 bpm\par He has no chest pain, no shortness of breath, no dyspnea on exertion, no orthopnea, no PND. He denies dizziness, lightheadedness and syncope. He has no exertional symptoms. He presents for evaluation.\par \par \par \par Daughter RN Priya Carrillomiles [phone] negative...

## 2022-07-29 NOTE — DISCUSSION/SUMMARY
[FreeTextEntry1] : Mr. Valdemar Gardner is a pleasant 72 year-old man with hypertension, hyperlipidemia, diabetes mellitus, s/p bariatric surgery, frequent PVCs, and recurrent SVT associated with palpitations. \par \par I recommend to continue same medications\par \par The possible causes of his supraventricular tachycardia are AV nicole reentry, concealed accessory bypass tract or atrial tachycardia. Due to fact that he is having severe symptoms, he is a candidate for electrophysiology study and catheter ablation. A description of the procedure and alternatives were discussed at length. He was informed that the procedure would be performed under moderate sedation and the procedure duration is about three hours. The success rate is 90-95% with a 5-10% or recurrence. We also discussed possible complications, which include but are not limited to groin complications, bleeding, vascular injury, perforation, arrhythmias, AV block and the need for permanent pacemaker, cardiac tamponade, need for surgery, and rare risks of stroke/heart attack/death.\par \par Procedure will be planned on 7/27/2022\par \par He verbalized understanding of the discussion and all questions were addressed and answered. He expressed agreement in proceeding with EP study and ablation. My  will be in contact with him for finalizing date, preadmission testing and instructions. Patient will follow with me in 2 months’ time. Please do not hesitate to contact me at [phone] if you have any further questions regarding this patient care.\par

## 2022-07-29 NOTE — CARDIOLOGY SUMMARY
[de-identified] : (2/5/2021) Nuclear stress no ischemia [de-identified] : (7/8/2022) ECG sinus rhythm at 85 bpm, PVC

## 2022-10-21 ENCOUNTER — APPOINTMENT (OUTPATIENT)
Dept: CARDIOLOGY | Facility: CLINIC | Age: 72
End: 2022-10-21

## 2023-10-31 ENCOUNTER — OUTPATIENT (OUTPATIENT)
Dept: OUTPATIENT SERVICES | Facility: HOSPITAL | Age: 73
LOS: 1 days | End: 2023-10-31
Payer: MEDICARE

## 2023-10-31 DIAGNOSIS — Z90.49 ACQUIRED ABSENCE OF OTHER SPECIFIED PARTS OF DIGESTIVE TRACT: Chronic | ICD-10-CM

## 2023-10-31 DIAGNOSIS — Z98.84 BARIATRIC SURGERY STATUS: Chronic | ICD-10-CM

## 2023-10-31 DIAGNOSIS — R07.9 CHEST PAIN, UNSPECIFIED: ICD-10-CM

## 2023-10-31 DIAGNOSIS — Z00.8 ENCOUNTER FOR OTHER GENERAL EXAMINATION: ICD-10-CM

## 2023-10-31 DIAGNOSIS — Z98.890 OTHER SPECIFIED POSTPROCEDURAL STATES: Chronic | ICD-10-CM

## 2023-10-31 PROCEDURE — 75574 CT ANGIO HRT W/3D IMAGE: CPT

## 2023-10-31 PROCEDURE — 75574 CT ANGIO HRT W/3D IMAGE: CPT | Mod: 26,MH

## 2023-11-01 DIAGNOSIS — R07.9 CHEST PAIN, UNSPECIFIED: ICD-10-CM

## 2024-02-01 ENCOUNTER — APPOINTMENT (OUTPATIENT)
Dept: SURGERY | Facility: CLINIC | Age: 74
End: 2024-02-01
Payer: MEDICARE

## 2024-02-01 VITALS
HEART RATE: 102 BPM | DIASTOLIC BLOOD PRESSURE: 80 MMHG | OXYGEN SATURATION: 98 % | HEIGHT: 67 IN | TEMPERATURE: 97.4 F | SYSTOLIC BLOOD PRESSURE: 140 MMHG | WEIGHT: 183 LBS | BODY MASS INDEX: 28.72 KG/M2

## 2024-02-01 PROCEDURE — 99213 OFFICE O/P EST LOW 20 MIN: CPT

## 2024-02-02 NOTE — ASSESSMENT
[FreeTextEntry1] : 74-year-old male with history of Brittney-en-Y gastric bypass 2012 presenting with epigastric discomfort, does consume alcohol but no other marginal ulcer risk factors  - Will refer for endoscopy to rule out marginal ulcer, hiatal hernia, gastrojejunal anastomotic stenosis

## 2024-02-02 NOTE — HISTORY OF PRESENT ILLNESS
[de-identified] : 74-year-old male with history of Brittney-en-Y gastric bypass in 2012 presents for evaluation of abdominal pain.  He had been seen by Dr. Willoughby in 2022 for abdominal pain as well.  It was a single episode at that time, the plan was to get a CAT scan should the pain recur but has not.  Presently, the patient reports a "funny feeling" and not so much of pain in his epigastrium.  Also reports having the sensation of gas.  Reports he is having normal bowel movements but is going more frequently.  Notices the pain more on an empty stomach.  Less so after meals.  Occasionally has nausea after meals.  Denies smoking, NSAID use, steroid use.  Does report regular alcohol use.  Past medical history: Hypertension, diabetes Past surgical history: Brittney-en-Y gastric bypass, lap yaa, ablation for SVT

## 2024-02-02 NOTE — PHYSICAL EXAM
[Normal] : affect appropriate [de-identified] : Nonlabored respirations [de-identified] : Soft, nontender, nondistended

## 2024-03-11 ENCOUNTER — APPOINTMENT (OUTPATIENT)
Dept: GASTROENTEROLOGY | Facility: CLINIC | Age: 74
End: 2024-03-11
Payer: MEDICARE

## 2024-03-11 DIAGNOSIS — E66.9 OBESITY, UNSPECIFIED: ICD-10-CM

## 2024-03-11 DIAGNOSIS — R10.13 EPIGASTRIC PAIN: ICD-10-CM

## 2024-03-11 DIAGNOSIS — Z98.84 BARIATRIC SURGERY STATUS: ICD-10-CM

## 2024-03-11 DIAGNOSIS — E11.9 TYPE 2 DIABETES MELLITUS W/OUT COMPLICATIONS: ICD-10-CM

## 2024-03-11 DIAGNOSIS — Z90.49 ACQUIRED ABSENCE OF OTHER SPECIFIED PARTS OF DIGESTIVE TRACT: ICD-10-CM

## 2024-03-11 PROCEDURE — 99443: CPT

## 2024-03-11 NOTE — REASON FOR VISIT
[Home] : at home, [unfilled] , at the time of the visit. [Medical Office: ([facility])___] : at the medical office located in  [Verbal consent obtained from patient] : the patient, [unfilled] [FreeTextEntry1] : NPA REF BY

## 2024-03-11 NOTE — HISTORY OF PRESENT ILLNESS
[FreeTextEntry1] : 74-year-old male with history of Brittney-en-Y gastric bypass 2012 presenting with epigastric discomfort, does consume occasional alcohol but no other marginal ulcer risk factors Endorses bloating and gas but no other symptoms. Denie hematochezia. melena weight loss. Last colonoscopy 6 or 7 years ago. Cologuard last year normal reportedly FMHx: no cancer Social Hx: occasional ETOH, no smoking no drugs

## 2024-06-03 ENCOUNTER — APPOINTMENT (OUTPATIENT)
Dept: GASTROENTEROLOGY | Facility: CLINIC | Age: 74
End: 2024-06-03

## 2025-07-15 ENCOUNTER — APPOINTMENT (OUTPATIENT)
Dept: SURGERY | Facility: CLINIC | Age: 75
End: 2025-07-15
Payer: MEDICARE

## 2025-07-15 VITALS — WEIGHT: 155 LBS | BODY MASS INDEX: 24.33 KG/M2 | HEIGHT: 67 IN

## 2025-07-15 PROCEDURE — 99213 OFFICE O/P EST LOW 20 MIN: CPT

## 2025-08-07 ENCOUNTER — OUTPATIENT (OUTPATIENT)
Dept: OUTPATIENT SERVICES | Facility: HOSPITAL | Age: 75
LOS: 1 days | End: 2025-08-07
Payer: MEDICARE

## 2025-08-07 VITALS
WEIGHT: 160.94 LBS | DIASTOLIC BLOOD PRESSURE: 75 MMHG | HEIGHT: 66 IN | SYSTOLIC BLOOD PRESSURE: 152 MMHG | HEART RATE: 65 BPM | RESPIRATION RATE: 16 BRPM | OXYGEN SATURATION: 98 % | TEMPERATURE: 98 F

## 2025-08-07 DIAGNOSIS — Z98.84 BARIATRIC SURGERY STATUS: Chronic | ICD-10-CM

## 2025-08-07 DIAGNOSIS — Z98.890 OTHER SPECIFIED POSTPROCEDURAL STATES: Chronic | ICD-10-CM

## 2025-08-07 DIAGNOSIS — K40.90 UNILATERAL INGUINAL HERNIA, WITHOUT OBSTRUCTION OR GANGRENE, NOT SPECIFIED AS RECURRENT: ICD-10-CM

## 2025-08-07 DIAGNOSIS — Z01.818 ENCOUNTER FOR OTHER PREPROCEDURAL EXAMINATION: ICD-10-CM

## 2025-08-07 PROBLEM — K21.9 GASTRO-ESOPHAGEAL REFLUX DISEASE WITHOUT ESOPHAGITIS: Chronic | Status: INACTIVE | Noted: 2018-02-28 | Resolved: 2025-08-07

## 2025-08-07 LAB
A1C WITH ESTIMATED AVERAGE GLUCOSE RESULT: 7 % — HIGH (ref 4–5.6)
ALBUMIN SERPL ELPH-MCNC: 4.5 G/DL — SIGNIFICANT CHANGE UP (ref 3.5–5.2)
ALP SERPL-CCNC: 77 U/L — SIGNIFICANT CHANGE UP (ref 30–115)
ALT FLD-CCNC: 22 U/L — SIGNIFICANT CHANGE UP (ref 0–41)
ANION GAP SERPL CALC-SCNC: 10 MMOL/L — SIGNIFICANT CHANGE UP (ref 7–14)
APPEARANCE UR: CLEAR — SIGNIFICANT CHANGE UP
AST SERPL-CCNC: 22 U/L — SIGNIFICANT CHANGE UP (ref 0–41)
BASOPHILS # BLD AUTO: 0.08 K/UL — SIGNIFICANT CHANGE UP (ref 0–0.2)
BASOPHILS NFR BLD AUTO: 1.1 % — HIGH (ref 0–1)
BILIRUB SERPL-MCNC: 0.9 MG/DL — SIGNIFICANT CHANGE UP (ref 0.2–1.2)
BILIRUB UR-MCNC: NEGATIVE — SIGNIFICANT CHANGE UP
BUN SERPL-MCNC: 22 MG/DL — HIGH (ref 10–20)
CALCIUM SERPL-MCNC: 9.1 MG/DL — SIGNIFICANT CHANGE UP (ref 8.4–10.5)
CHLORIDE SERPL-SCNC: 104 MMOL/L — SIGNIFICANT CHANGE UP (ref 98–110)
CO2 SERPL-SCNC: 25 MMOL/L — SIGNIFICANT CHANGE UP (ref 17–32)
COLOR SPEC: YELLOW — SIGNIFICANT CHANGE UP
CREAT SERPL-MCNC: 1 MG/DL — SIGNIFICANT CHANGE UP (ref 0.7–1.5)
DIFF PNL FLD: NEGATIVE — SIGNIFICANT CHANGE UP
EGFR: 78 ML/MIN/1.73M2 — SIGNIFICANT CHANGE UP
EGFR: 78 ML/MIN/1.73M2 — SIGNIFICANT CHANGE UP
EOSINOPHIL # BLD AUTO: 0.46 K/UL — SIGNIFICANT CHANGE UP (ref 0–0.7)
EOSINOPHIL NFR BLD AUTO: 6.6 % — SIGNIFICANT CHANGE UP (ref 0–8)
ESTIMATED AVERAGE GLUCOSE: 154 MG/DL — HIGH (ref 68–114)
GLUCOSE SERPL-MCNC: 128 MG/DL — HIGH (ref 70–99)
GLUCOSE UR QL: >=1000 MG/DL
HCT VFR BLD CALC: 40 % — LOW (ref 42–52)
HGB BLD-MCNC: 13.5 G/DL — LOW (ref 14–18)
IMM GRANULOCYTES NFR BLD AUTO: 0.3 % — SIGNIFICANT CHANGE UP (ref 0.1–0.3)
KETONES UR QL: NEGATIVE MG/DL — SIGNIFICANT CHANGE UP
LEUKOCYTE ESTERASE UR-ACNC: NEGATIVE — SIGNIFICANT CHANGE UP
LYMPHOCYTES # BLD AUTO: 2.31 K/UL — SIGNIFICANT CHANGE UP (ref 1.2–3.4)
LYMPHOCYTES # BLD AUTO: 33.1 % — SIGNIFICANT CHANGE UP (ref 20.5–51.1)
MCHC RBC-ENTMCNC: 32.5 PG — HIGH (ref 27–31)
MCHC RBC-ENTMCNC: 33.8 G/DL — SIGNIFICANT CHANGE UP (ref 32–37)
MCV RBC AUTO: 96.4 FL — HIGH (ref 80–94)
MONOCYTES # BLD AUTO: 0.74 K/UL — HIGH (ref 0.1–0.6)
MONOCYTES NFR BLD AUTO: 10.6 % — HIGH (ref 1.7–9.3)
MRSA PCR RESULT.: SIGNIFICANT CHANGE UP
NEUTROPHILS # BLD AUTO: 3.36 K/UL — SIGNIFICANT CHANGE UP (ref 1.4–6.5)
NEUTROPHILS NFR BLD AUTO: 48.3 % — SIGNIFICANT CHANGE UP (ref 42.2–75.2)
NITRITE UR-MCNC: NEGATIVE — SIGNIFICANT CHANGE UP
NRBC BLD AUTO-RTO: 0 /100 WBCS — SIGNIFICANT CHANGE UP (ref 0–0)
PH UR: 6 — SIGNIFICANT CHANGE UP (ref 5–8)
PLATELET # BLD AUTO: 185 K/UL — SIGNIFICANT CHANGE UP (ref 130–400)
PMV BLD: 12.4 FL — HIGH (ref 7.4–10.4)
POTASSIUM SERPL-MCNC: 4.5 MMOL/L — SIGNIFICANT CHANGE UP (ref 3.5–5)
POTASSIUM SERPL-SCNC: 4.5 MMOL/L — SIGNIFICANT CHANGE UP (ref 3.5–5)
PROT SERPL-MCNC: 6.5 G/DL — SIGNIFICANT CHANGE UP (ref 6–8)
PROT UR-MCNC: NEGATIVE MG/DL — SIGNIFICANT CHANGE UP
RBC # BLD: 4.15 M/UL — LOW (ref 4.7–6.1)
RBC # FLD: 13.6 % — SIGNIFICANT CHANGE UP (ref 11.5–14.5)
SODIUM SERPL-SCNC: 139 MMOL/L — SIGNIFICANT CHANGE UP (ref 135–146)
SP GR SPEC: 1.02 — SIGNIFICANT CHANGE UP (ref 1–1.03)
UROBILINOGEN FLD QL: 1 MG/DL — SIGNIFICANT CHANGE UP (ref 0.2–1)
WBC # BLD: 6.97 K/UL — SIGNIFICANT CHANGE UP (ref 4.8–10.8)
WBC # FLD AUTO: 6.97 K/UL — SIGNIFICANT CHANGE UP (ref 4.8–10.8)

## 2025-08-07 PROCEDURE — 81003 URINALYSIS AUTO W/O SCOPE: CPT

## 2025-08-07 PROCEDURE — 87641 MR-STAPH DNA AMP PROBE: CPT

## 2025-08-07 PROCEDURE — 85025 COMPLETE CBC W/AUTO DIFF WBC: CPT

## 2025-08-07 PROCEDURE — 83036 HEMOGLOBIN GLYCOSYLATED A1C: CPT

## 2025-08-07 PROCEDURE — 93010 ELECTROCARDIOGRAM REPORT: CPT

## 2025-08-07 PROCEDURE — 87640 STAPH A DNA AMP PROBE: CPT

## 2025-08-07 PROCEDURE — 80053 COMPREHEN METABOLIC PANEL: CPT

## 2025-08-07 PROCEDURE — 93005 ELECTROCARDIOGRAM TRACING: CPT

## 2025-08-07 PROCEDURE — 99214 OFFICE O/P EST MOD 30 MIN: CPT | Mod: 25

## 2025-08-07 PROCEDURE — 36415 COLL VENOUS BLD VENIPUNCTURE: CPT

## 2025-08-08 DIAGNOSIS — Z01.818 ENCOUNTER FOR OTHER PREPROCEDURAL EXAMINATION: ICD-10-CM

## 2025-08-08 DIAGNOSIS — K40.90 UNILATERAL INGUINAL HERNIA, WITHOUT OBSTRUCTION OR GANGRENE, NOT SPECIFIED AS RECURRENT: ICD-10-CM

## 2025-08-15 PROBLEM — I49.3 VENTRICULAR PREMATURE DEPOLARIZATION: Chronic | Status: ACTIVE | Noted: 2025-08-07

## 2025-08-15 PROBLEM — Z87.19 PERSONAL HISTORY OF OTHER DISEASES OF THE DIGESTIVE SYSTEM: Chronic | Status: ACTIVE | Noted: 2025-08-07

## 2025-08-18 ENCOUNTER — NON-APPOINTMENT (OUTPATIENT)
Age: 75
End: 2025-08-18

## 2025-08-20 ENCOUNTER — OUTPATIENT (OUTPATIENT)
Dept: OUTPATIENT SERVICES | Facility: HOSPITAL | Age: 75
LOS: 1 days | Discharge: HOME | End: 2025-08-20
Payer: MEDICARE

## 2025-08-20 ENCOUNTER — APPOINTMENT (OUTPATIENT)
Dept: SURGERY | Facility: AMBULATORY SURGERY CENTER | Age: 75
End: 2025-08-20
Payer: MEDICARE

## 2025-08-20 ENCOUNTER — TRANSCRIPTION ENCOUNTER (OUTPATIENT)
Age: 75
End: 2025-08-20

## 2025-08-20 VITALS
HEART RATE: 63 BPM | RESPIRATION RATE: 16 BRPM | DIASTOLIC BLOOD PRESSURE: 63 MMHG | OXYGEN SATURATION: 97 % | SYSTOLIC BLOOD PRESSURE: 141 MMHG

## 2025-08-20 VITALS
DIASTOLIC BLOOD PRESSURE: 63 MMHG | HEART RATE: 60 BPM | HEIGHT: 66 IN | RESPIRATION RATE: 18 BRPM | OXYGEN SATURATION: 99 % | WEIGHT: 160.94 LBS | TEMPERATURE: 98 F | SYSTOLIC BLOOD PRESSURE: 143 MMHG

## 2025-08-20 DIAGNOSIS — Z98.890 OTHER SPECIFIED POSTPROCEDURAL STATES: Chronic | ICD-10-CM

## 2025-08-20 DIAGNOSIS — Z98.84 BARIATRIC SURGERY STATUS: Chronic | ICD-10-CM

## 2025-08-20 DIAGNOSIS — K40.90 UNILATERAL INGUINAL HERNIA, WITHOUT OBSTRUCTION OR GANGRENE, NOT SPECIFIED AS RECURRENT: ICD-10-CM

## 2025-08-20 LAB — GLUCOSE BLDC GLUCOMTR-MCNC: 141 MG/DL — HIGH (ref 70–99)

## 2025-08-20 PROCEDURE — 82962 GLUCOSE BLOOD TEST: CPT

## 2025-08-20 PROCEDURE — C1781: CPT

## 2025-08-20 PROCEDURE — 49505 PRP I/HERN INIT REDUC >5 YR: CPT | Mod: RT

## 2025-08-20 RX ORDER — HYDROMORPHONE/SOD CHLOR,ISO/PF 2 MG/10 ML
0.5 SYRINGE (ML) INJECTION
Refills: 0 | Status: DISCONTINUED | OUTPATIENT
Start: 2025-08-20 | End: 2025-08-20

## 2025-08-20 RX ORDER — SODIUM CHLORIDE 9 G/1000ML
1000 INJECTION, SOLUTION INTRAVENOUS
Refills: 0 | Status: DISCONTINUED | OUTPATIENT
Start: 2025-08-20 | End: 2025-08-20

## 2025-08-20 RX ORDER — ONDANSETRON HCL/PF 4 MG/2 ML
4 VIAL (ML) INJECTION ONCE
Refills: 0 | Status: DISCONTINUED | OUTPATIENT
Start: 2025-08-20 | End: 2025-08-20

## 2025-08-20 RX ORDER — ACETAMINOPHEN 500 MG/5ML
1000 LIQUID (ML) ORAL ONCE
Refills: 0 | Status: DISCONTINUED | OUTPATIENT
Start: 2025-08-20 | End: 2025-08-20

## 2025-08-20 RX ORDER — OXYCODONE HYDROCHLORIDE 30 MG/1
5 TABLET ORAL ONCE
Refills: 0 | Status: DISCONTINUED | OUTPATIENT
Start: 2025-08-20 | End: 2025-08-20

## 2025-08-20 RX ORDER — DAPAGLIFLOZIN 5 MG/1
1 TABLET, FILM COATED ORAL
Refills: 0 | DISCHARGE

## 2025-08-20 RX ADMIN — SODIUM CHLORIDE 100 MILLILITER(S): 9 INJECTION, SOLUTION INTRAVENOUS at 08:48

## 2025-08-25 DIAGNOSIS — K40.90 UNILATERAL INGUINAL HERNIA, WITHOUT OBSTRUCTION OR GANGRENE, NOT SPECIFIED AS RECURRENT: ICD-10-CM

## 2025-08-27 ENCOUNTER — APPOINTMENT (OUTPATIENT)
Dept: SURGERY | Facility: CLINIC | Age: 75
End: 2025-08-27
Payer: MEDICARE

## 2025-08-27 DIAGNOSIS — K40.90 UNILATERAL INGUINAL HERNIA, W/OUT OBSTRUCTION OR GANGRENE, NOT SPECIFIED AS RECURRENT: ICD-10-CM

## 2025-08-27 PROCEDURE — 99024 POSTOP FOLLOW-UP VISIT: CPT

## 2025-08-27 RX ORDER — TRAMADOL HYDROCHLORIDE 50 MG/1
50 TABLET, COATED ORAL
Qty: 12 | Refills: 0 | Status: COMPLETED | COMMUNITY
Start: 2025-08-18 | End: 2025-08-27

## 2025-09-16 ENCOUNTER — APPOINTMENT (OUTPATIENT)
Dept: ORTHOPEDIC SURGERY | Facility: CLINIC | Age: 75
End: 2025-09-16